# Patient Record
Sex: FEMALE | Race: ASIAN | NOT HISPANIC OR LATINO | URBAN - METROPOLITAN AREA
[De-identification: names, ages, dates, MRNs, and addresses within clinical notes are randomized per-mention and may not be internally consistent; named-entity substitution may affect disease eponyms.]

---

## 2019-08-15 ENCOUNTER — EMERGENCY (EMERGENCY)
Facility: HOSPITAL | Age: 74
LOS: 1 days | Discharge: HOME | End: 2019-08-15
Attending: EMERGENCY MEDICINE | Admitting: EMERGENCY MEDICINE
Payer: MEDICAID

## 2019-08-15 VITALS
SYSTOLIC BLOOD PRESSURE: 101 MMHG | TEMPERATURE: 98 F | DIASTOLIC BLOOD PRESSURE: 70 MMHG | OXYGEN SATURATION: 99 % | RESPIRATION RATE: 18 BRPM | HEART RATE: 89 BPM

## 2019-08-15 VITALS
OXYGEN SATURATION: 97 % | RESPIRATION RATE: 15 BRPM | HEART RATE: 95 BPM | TEMPERATURE: 98 F | DIASTOLIC BLOOD PRESSURE: 86 MMHG | SYSTOLIC BLOOD PRESSURE: 124 MMHG

## 2019-08-15 LAB
ALBUMIN SERPL ELPH-MCNC: 4.4 G/DL — SIGNIFICANT CHANGE UP (ref 3.3–5)
ALP SERPL-CCNC: 65 U/L — SIGNIFICANT CHANGE UP (ref 40–120)
ALT FLD-CCNC: 22 U/L — SIGNIFICANT CHANGE UP (ref 4–33)
ANION GAP SERPL CALC-SCNC: 13 MMO/L — SIGNIFICANT CHANGE UP (ref 7–14)
APPEARANCE UR: CLEAR — SIGNIFICANT CHANGE UP
AST SERPL-CCNC: 18 U/L — SIGNIFICANT CHANGE UP (ref 4–32)
B PERT DNA SPEC QL NAA+PROBE: NOT DETECTED — SIGNIFICANT CHANGE UP
BASE EXCESS BLDV CALC-SCNC: 2.9 MMOL/L — SIGNIFICANT CHANGE UP
BASOPHILS # BLD AUTO: 0.06 K/UL — SIGNIFICANT CHANGE UP (ref 0–0.2)
BASOPHILS NFR BLD AUTO: 0.6 % — SIGNIFICANT CHANGE UP (ref 0–2)
BILIRUB SERPL-MCNC: 0.5 MG/DL — SIGNIFICANT CHANGE UP (ref 0.2–1.2)
BILIRUB UR-MCNC: NEGATIVE — SIGNIFICANT CHANGE UP
BLOOD GAS VENOUS - CREATININE: 0.77 MG/DL — SIGNIFICANT CHANGE UP (ref 0.5–1.3)
BLOOD GAS VENOUS - FIO2: 21 — SIGNIFICANT CHANGE UP
BLOOD UR QL VISUAL: NEGATIVE — SIGNIFICANT CHANGE UP
BUN SERPL-MCNC: 12 MG/DL — SIGNIFICANT CHANGE UP (ref 7–23)
C PNEUM DNA SPEC QL NAA+PROBE: NOT DETECTED — SIGNIFICANT CHANGE UP
CALCIUM SERPL-MCNC: 10 MG/DL — SIGNIFICANT CHANGE UP (ref 8.4–10.5)
CHLORIDE BLDV-SCNC: 107 MMOL/L — SIGNIFICANT CHANGE UP (ref 96–108)
CHLORIDE SERPL-SCNC: 101 MMOL/L — SIGNIFICANT CHANGE UP (ref 98–107)
CK SERPL-CCNC: 68 U/L — SIGNIFICANT CHANGE UP (ref 25–170)
CO2 SERPL-SCNC: 24 MMOL/L — SIGNIFICANT CHANGE UP (ref 22–31)
COLOR SPEC: COLORLESS — SIGNIFICANT CHANGE UP
CREAT SERPL-MCNC: 0.76 MG/DL — SIGNIFICANT CHANGE UP (ref 0.5–1.3)
EOSINOPHIL # BLD AUTO: 0.25 K/UL — SIGNIFICANT CHANGE UP (ref 0–0.5)
EOSINOPHIL NFR BLD AUTO: 2.6 % — SIGNIFICANT CHANGE UP (ref 0–6)
FLUAV H1 2009 PAND RNA SPEC QL NAA+PROBE: NOT DETECTED — SIGNIFICANT CHANGE UP
FLUAV H1 RNA SPEC QL NAA+PROBE: NOT DETECTED — SIGNIFICANT CHANGE UP
FLUAV H3 RNA SPEC QL NAA+PROBE: NOT DETECTED — SIGNIFICANT CHANGE UP
FLUAV SUBTYP SPEC NAA+PROBE: NOT DETECTED — SIGNIFICANT CHANGE UP
FLUBV RNA SPEC QL NAA+PROBE: NOT DETECTED — SIGNIFICANT CHANGE UP
GAS PNL BLDV: 133 MMOL/L — LOW (ref 136–146)
GLUCOSE BLDV-MCNC: 108 MG/DL — HIGH (ref 70–99)
GLUCOSE SERPL-MCNC: 111 MG/DL — HIGH (ref 70–99)
GLUCOSE UR-MCNC: NEGATIVE — SIGNIFICANT CHANGE UP
HADV DNA SPEC QL NAA+PROBE: NOT DETECTED — SIGNIFICANT CHANGE UP
HCO3 BLDV-SCNC: 27 MMOL/L — SIGNIFICANT CHANGE UP (ref 20–27)
HCOV PNL SPEC NAA+PROBE: SIGNIFICANT CHANGE UP
HCT VFR BLD CALC: 40.2 % — SIGNIFICANT CHANGE UP (ref 34.5–45)
HCT VFR BLDV CALC: 40.1 % — SIGNIFICANT CHANGE UP (ref 34.5–45)
HGB BLD-MCNC: 13.2 G/DL — SIGNIFICANT CHANGE UP (ref 11.5–15.5)
HGB BLDV-MCNC: 13 G/DL — SIGNIFICANT CHANGE UP (ref 11.5–15.5)
HMPV RNA SPEC QL NAA+PROBE: NOT DETECTED — SIGNIFICANT CHANGE UP
HPIV1 RNA SPEC QL NAA+PROBE: NOT DETECTED — SIGNIFICANT CHANGE UP
HPIV2 RNA SPEC QL NAA+PROBE: NOT DETECTED — SIGNIFICANT CHANGE UP
HPIV3 RNA SPEC QL NAA+PROBE: NOT DETECTED — SIGNIFICANT CHANGE UP
HPIV4 RNA SPEC QL NAA+PROBE: NOT DETECTED — SIGNIFICANT CHANGE UP
IMM GRANULOCYTES NFR BLD AUTO: 0.4 % — SIGNIFICANT CHANGE UP (ref 0–1.5)
KETONES UR-MCNC: NEGATIVE — SIGNIFICANT CHANGE UP
LACTATE BLDV-MCNC: 1.6 MMOL/L — SIGNIFICANT CHANGE UP (ref 0.5–2)
LEUKOCYTE ESTERASE UR-ACNC: NEGATIVE — SIGNIFICANT CHANGE UP
LYMPHOCYTES # BLD AUTO: 2 K/UL — SIGNIFICANT CHANGE UP (ref 1–3.3)
LYMPHOCYTES # BLD AUTO: 20.8 % — SIGNIFICANT CHANGE UP (ref 13–44)
MCHC RBC-ENTMCNC: 29.1 PG — SIGNIFICANT CHANGE UP (ref 27–34)
MCHC RBC-ENTMCNC: 32.8 % — SIGNIFICANT CHANGE UP (ref 32–36)
MCV RBC AUTO: 88.7 FL — SIGNIFICANT CHANGE UP (ref 80–100)
MONOCYTES # BLD AUTO: 0.94 K/UL — HIGH (ref 0–0.9)
MONOCYTES NFR BLD AUTO: 9.8 % — SIGNIFICANT CHANGE UP (ref 2–14)
NEUTROPHILS # BLD AUTO: 6.33 K/UL — SIGNIFICANT CHANGE UP (ref 1.8–7.4)
NEUTROPHILS NFR BLD AUTO: 65.8 % — SIGNIFICANT CHANGE UP (ref 43–77)
NITRITE UR-MCNC: NEGATIVE — SIGNIFICANT CHANGE UP
NRBC # FLD: 0 K/UL — SIGNIFICANT CHANGE UP (ref 0–0)
PCO2 BLDV: 40 MMHG — LOW (ref 41–51)
PH BLDV: 7.45 PH — HIGH (ref 7.32–7.43)
PH UR: 7 — SIGNIFICANT CHANGE UP (ref 5–8)
PLATELET # BLD AUTO: 283 K/UL — SIGNIFICANT CHANGE UP (ref 150–400)
PMV BLD: 9.5 FL — SIGNIFICANT CHANGE UP (ref 7–13)
PO2 BLDV: 59 MMHG — HIGH (ref 35–40)
POTASSIUM BLDV-SCNC: 3.5 MMOL/L — SIGNIFICANT CHANGE UP (ref 3.4–4.5)
POTASSIUM SERPL-MCNC: 4 MMOL/L — SIGNIFICANT CHANGE UP (ref 3.5–5.3)
POTASSIUM SERPL-SCNC: 4 MMOL/L — SIGNIFICANT CHANGE UP (ref 3.5–5.3)
PROT SERPL-MCNC: 7.6 G/DL — SIGNIFICANT CHANGE UP (ref 6–8.3)
PROT UR-MCNC: NEGATIVE — SIGNIFICANT CHANGE UP
RBC # BLD: 4.53 M/UL — SIGNIFICANT CHANGE UP (ref 3.8–5.2)
RBC # FLD: 11.8 % — SIGNIFICANT CHANGE UP (ref 10.3–14.5)
RSV RNA SPEC QL NAA+PROBE: NOT DETECTED — SIGNIFICANT CHANGE UP
RV+EV RNA SPEC QL NAA+PROBE: NOT DETECTED — SIGNIFICANT CHANGE UP
SAO2 % BLDV: 90.1 % — HIGH (ref 60–85)
SODIUM SERPL-SCNC: 138 MMOL/L — SIGNIFICANT CHANGE UP (ref 135–145)
SP GR SPEC: 1.01 — SIGNIFICANT CHANGE UP (ref 1–1.04)
UROBILINOGEN FLD QL: NORMAL — SIGNIFICANT CHANGE UP
WBC # BLD: 9.62 K/UL — SIGNIFICANT CHANGE UP (ref 3.8–10.5)
WBC # FLD AUTO: 9.62 K/UL — SIGNIFICANT CHANGE UP (ref 3.8–10.5)

## 2019-08-15 PROCEDURE — 99283 EMERGENCY DEPT VISIT LOW MDM: CPT

## 2019-08-15 PROCEDURE — 71046 X-RAY EXAM CHEST 2 VIEWS: CPT | Mod: 26

## 2019-08-15 RX ORDER — IBUPROFEN 200 MG
1 TABLET ORAL
Qty: 6 | Refills: 0
Start: 2019-08-15 | End: 2019-08-16

## 2019-08-15 RX ORDER — IBUPROFEN 200 MG
600 TABLET ORAL ONCE
Refills: 0 | Status: COMPLETED | OUTPATIENT
Start: 2019-08-15 | End: 2019-08-15

## 2019-08-15 RX ADMIN — Medication 600 MILLIGRAM(S): at 08:41

## 2019-08-15 RX ADMIN — Medication 600 MILLIGRAM(S): at 10:08

## 2019-08-15 NOTE — ED PROVIDER NOTE - CLINICAL SUMMARY MEDICAL DECISION MAKING FREE TEXT BOX
mylagias/arthralgias, subjective fever and cough, concern for PNA, pt well appearing, afebrile rectally, S1S2 appreciated, slightly  decreased breath sounds overt Rt lower lobe - labs, CXR, pain control, will reasses;

## 2019-08-15 NOTE — ED ADULT NURSE REASSESSMENT NOTE - NS ED NURSE REASSESS COMMENT FT1
RVP is negative at this time, pt will be taken off of droplet precautions
Pt resting comfortably in bed, states pain is tolerable at this time, pt stable, in NAD, will continue to monitor.

## 2019-08-15 NOTE — ED ADULT TRIAGE NOTE - CHIEF COMPLAINT QUOTE
alert c/o generalized body pains and fever since yesterday   no c/o chest pain  or SOB    treated for pneumonia in jan feb and march   hx htn  acid reflux   temp 97.8 in triage

## 2019-08-15 NOTE — ED PROVIDER NOTE - NSFOLLOWUPINSTRUCTIONS_ED_ALL_ED_FT
PLEASE MAKE SURE THAT YOU FOLLOW UP WITH RHEUMATOLOGIST AS DISCUSSED, ALSO SCHEDULE AN APPOINTMENT TO SEE PULMONOLOGIST AT THE EARLIEST OPENING.  FOR THE NEXT 2 DAYS YOU MAY TAKE LOW DOSES OF IBUPROFEN (400MG EVERY 8HRS); RETURN TO ER FOR WORSENING SYMPTOMS;

## 2019-08-15 NOTE — ED ADULT NURSE REASSESSMENT NOTE - GENERAL PATIENT STATE
cooperative/comfortable appearance/resting/sleeping/improvement verbalized/no change observed/family/SO at bedside

## 2019-08-15 NOTE — ED PROVIDER NOTE - PHYSICAL EXAMINATION
non-toxic appearing   no lumbar spine TTP noted   slightly decreased sounds over Rt lower lobe non-toxic appearing   no lumbar spine TTP noted   slightly decreased sounds over Rt lower lobe   no joint swelling throughout, no warmness to touch non-toxic appearing   no lumbar spine TTP noted   slightly decreased sounds over Rt lower lobe   no joint swelling throughout, no warmness to touch    Attending/Magdaleno: NAD; PERRL/EOMI, non-icterus, supple, no KEVIN, no JVD, RRR, CTAB; Abd-soft, NT/ND, no HSM; no LE edema, no koint PT, eff, or increase warmth, FROM; A&Ox3, nonfocal; Skin-warm/dry

## 2019-08-15 NOTE — ED ADULT NURSE NOTE - OBJECTIVE STATEMENT
73y/o female aaox4 75y/o female aaox4 and ambulatory primarily Urdu speaking; denies translation services at this time, request family at bedside translates. Pt states experiencing generalized body aches x3-4 days; pt states the pain is in muscles and joints; pt states "the pain is where anything bends." Pt states always feeling R leg and R arm pain, but now she feels it everywhere. Pt denies chest pain, SOB, fevers, chills, diaphoresis, palpitations, urinary urgency/frequency, hematuria. Pt denies any recent travel. Vital signs as noted. Call light within reach. Will continue to monitor.

## 2019-08-15 NOTE — ED PROVIDER NOTE - PROGRESS NOTE DETAILS
ELIGIO Lane - pt is feeling significantly better, CXR neg for PNA, joint pain most likely due to rheum process, pt already has denis with rheum - if RVP neg will dc with o/p rheum f/u. will also provide o/p pulm referral for chronic cough;

## 2019-08-15 NOTE — ED PROVIDER NOTE - OBJECTIVE STATEMENT
Pt is 73 y/o female with PMHx of HTN, chronic back pain (Mohawk speaking - translation services offered but but declined and  wishes that daughter translates) here for eval of generalized body pain and arthralgias x 3 days. As per daughter pt occasionaly gets "body pain" but it usually resoles after Tylenol which pt has been taking w/o relief. Pt is 73 y/o female with PMHx of HTN, chronic back pain (Frisian speaking - translation services offered but but declined and  wishes that daughter translates) here for eval of generalized body pain and arthralgias x 3 days. As per daughter pt occasionally gets "body pain" but it usually resoles after Tylenol which pt has been taking w/o relief. Denies HA, rash, neck pain/stiffness, denies abd pain, n/v/d, urinary sx, admits to cough (as per daughter "for a along time, her Rt lung  is not working." (-) recent travel, sick contacts, hiking hx, insect/tick bites. weight loss, hemoptysis, denies cp, sob; rectal temp in the ER - 99.1; pcp - DR Garcia. Gets PT for lower back pain, also scheduled for epidural injection in 2 wks;   pt denies ext weakness or numbness, denies saddle anesthesia, urinary retention, denies fever, chills, urinary sx, chronic steroids use, IVDU, denies hx of malignancy, denies hx of spinal surgeries; Pt is 75 y/o female with PMHx of HTN, chronic back pain (Spanish speaking - translation services offered but but declined and  wishes that daughter translates) here for eval of generalized body pain and arthralgias x 3 days. As per daughter pt occasionally gets "body pain" but it usually resoles after Tylenol which pt has been taking w/o relief. Denies HA, rash, neck pain/stiffness, denies abd pain, n/v/d, urinary sx, admits to cough (as per daughter "for a along time, her Rt lung  is not working." (-) recent travel, sick contacts, hiking hx, insect/tick bites. weight loss, hemoptysis, denies cp, sob; rectal temp in the ER - 99.1; pcp - DR Garcia. Gets PT for lower back pain, also scheduled for epidural injection in 2 wks;   pt denies ext weakness or numbness, denies saddle anesthesia, urinary retention, denies fever, chills, urinary sx, chronic steroids use, IVDU, denies hx of malignancy, denies hx of spinal surgeries;    Attending/Magdaleno: 75 yo F as described as above, including HTN, chronic LBP, recurrent PNA chronic Rt shoulder pain p/w 2-3 days o cough, small productive yellow/brown sputum, and generalized arthralgias and fatigue. Denies fever/chills, SOB, palp, abd painm, chagne in urinary/bowel habits.

## 2019-08-16 LAB
BACTERIA UR CULT: SIGNIFICANT CHANGE UP
SPECIMEN SOURCE: SIGNIFICANT CHANGE UP

## 2019-08-20 LAB
BACTERIA BLD CULT: SIGNIFICANT CHANGE UP
BACTERIA BLD CULT: SIGNIFICANT CHANGE UP

## 2019-09-01 ENCOUNTER — OUTPATIENT (OUTPATIENT)
Dept: OUTPATIENT SERVICES | Facility: HOSPITAL | Age: 74
LOS: 1 days | End: 2019-09-01
Payer: MEDICAID

## 2019-09-01 PROCEDURE — G9001: CPT

## 2019-09-17 DIAGNOSIS — Z71.89 OTHER SPECIFIED COUNSELING: ICD-10-CM

## 2019-09-17 PROBLEM — I10 ESSENTIAL (PRIMARY) HYPERTENSION: Chronic | Status: ACTIVE | Noted: 2019-08-15

## 2023-01-07 ENCOUNTER — INPATIENT (INPATIENT)
Facility: HOSPITAL | Age: 78
LOS: 1 days | Discharge: HOME | End: 2023-01-09
Attending: INTERNAL MEDICINE | Admitting: INTERNAL MEDICINE
Payer: COMMERCIAL

## 2023-01-07 VITALS
TEMPERATURE: 98 F | SYSTOLIC BLOOD PRESSURE: 153 MMHG | OXYGEN SATURATION: 99 % | DIASTOLIC BLOOD PRESSURE: 88 MMHG | RESPIRATION RATE: 18 BRPM | HEART RATE: 99 BPM

## 2023-01-07 DIAGNOSIS — R06.02 SHORTNESS OF BREATH: ICD-10-CM

## 2023-01-07 LAB
ALBUMIN SERPL ELPH-MCNC: 4.1 G/DL — SIGNIFICANT CHANGE UP (ref 3.3–5)
ALP SERPL-CCNC: 67 U/L — SIGNIFICANT CHANGE UP (ref 40–120)
ALT FLD-CCNC: 13 U/L — SIGNIFICANT CHANGE UP (ref 4–33)
ANION GAP SERPL CALC-SCNC: 11 MMOL/L — SIGNIFICANT CHANGE UP (ref 7–14)
AST SERPL-CCNC: 16 U/L — SIGNIFICANT CHANGE UP (ref 4–32)
B PERT DNA SPEC QL NAA+PROBE: SIGNIFICANT CHANGE UP
B PERT+PARAPERT DNA PNL SPEC NAA+PROBE: SIGNIFICANT CHANGE UP
BASE EXCESS BLDV CALC-SCNC: 0.8 MMOL/L — SIGNIFICANT CHANGE UP (ref -2–3)
BASOPHILS # BLD AUTO: 0.05 K/UL — SIGNIFICANT CHANGE UP (ref 0–0.2)
BASOPHILS NFR BLD AUTO: 0.5 % — SIGNIFICANT CHANGE UP (ref 0–2)
BILIRUB SERPL-MCNC: 0.3 MG/DL — SIGNIFICANT CHANGE UP (ref 0.2–1.2)
BLOOD GAS VENOUS COMPREHENSIVE RESULT: SIGNIFICANT CHANGE UP
BORDETELLA PARAPERTUSSIS (RAPRVP): SIGNIFICANT CHANGE UP
BUN SERPL-MCNC: 14 MG/DL — SIGNIFICANT CHANGE UP (ref 7–23)
C PNEUM DNA SPEC QL NAA+PROBE: SIGNIFICANT CHANGE UP
CALCIUM SERPL-MCNC: 9.1 MG/DL — SIGNIFICANT CHANGE UP (ref 8.4–10.5)
CHLORIDE BLDV-SCNC: 106 MMOL/L — SIGNIFICANT CHANGE UP (ref 96–108)
CHLORIDE SERPL-SCNC: 104 MMOL/L — SIGNIFICANT CHANGE UP (ref 98–107)
CO2 BLDV-SCNC: 27.3 MMOL/L — HIGH (ref 22–26)
CO2 SERPL-SCNC: 25 MMOL/L — SIGNIFICANT CHANGE UP (ref 22–31)
CREAT SERPL-MCNC: 1.13 MG/DL — SIGNIFICANT CHANGE UP (ref 0.5–1.3)
EGFR: 50 ML/MIN/1.73M2 — LOW
EOSINOPHIL # BLD AUTO: 0.38 K/UL — SIGNIFICANT CHANGE UP (ref 0–0.5)
EOSINOPHIL NFR BLD AUTO: 3.6 % — SIGNIFICANT CHANGE UP (ref 0–6)
FLUAV SUBTYP SPEC NAA+PROBE: SIGNIFICANT CHANGE UP
FLUBV RNA SPEC QL NAA+PROBE: SIGNIFICANT CHANGE UP
GAS PNL BLDV: 137 MMOL/L — SIGNIFICANT CHANGE UP (ref 136–145)
GLUCOSE BLDV-MCNC: 87 MG/DL — SIGNIFICANT CHANGE UP (ref 70–99)
GLUCOSE SERPL-MCNC: 101 MG/DL — HIGH (ref 70–99)
HADV DNA SPEC QL NAA+PROBE: SIGNIFICANT CHANGE UP
HCO3 BLDV-SCNC: 26 MMOL/L — SIGNIFICANT CHANGE UP (ref 22–29)
HCOV 229E RNA SPEC QL NAA+PROBE: SIGNIFICANT CHANGE UP
HCOV HKU1 RNA SPEC QL NAA+PROBE: SIGNIFICANT CHANGE UP
HCOV NL63 RNA SPEC QL NAA+PROBE: SIGNIFICANT CHANGE UP
HCOV OC43 RNA SPEC QL NAA+PROBE: SIGNIFICANT CHANGE UP
HCT VFR BLD CALC: 39.7 % — SIGNIFICANT CHANGE UP (ref 34.5–45)
HCT VFR BLDA CALC: 38 % — SIGNIFICANT CHANGE UP (ref 34.5–46.5)
HGB BLD CALC-MCNC: 12.8 G/DL — SIGNIFICANT CHANGE UP (ref 11.5–15.5)
HGB BLD-MCNC: 13.2 G/DL — SIGNIFICANT CHANGE UP (ref 11.5–15.5)
HMPV RNA SPEC QL NAA+PROBE: SIGNIFICANT CHANGE UP
HPIV1 RNA SPEC QL NAA+PROBE: SIGNIFICANT CHANGE UP
HPIV2 RNA SPEC QL NAA+PROBE: SIGNIFICANT CHANGE UP
HPIV3 RNA SPEC QL NAA+PROBE: SIGNIFICANT CHANGE UP
HPIV4 RNA SPEC QL NAA+PROBE: SIGNIFICANT CHANGE UP
IANC: 6.08 K/UL — SIGNIFICANT CHANGE UP (ref 1.8–7.4)
IMM GRANULOCYTES NFR BLD AUTO: 1.1 % — HIGH (ref 0–0.9)
LACTATE BLDV-MCNC: 0.9 MMOL/L — SIGNIFICANT CHANGE UP (ref 0.5–2)
LYMPHOCYTES # BLD AUTO: 2.95 K/UL — SIGNIFICANT CHANGE UP (ref 1–3.3)
LYMPHOCYTES # BLD AUTO: 28 % — SIGNIFICANT CHANGE UP (ref 13–44)
M PNEUMO DNA SPEC QL NAA+PROBE: SIGNIFICANT CHANGE UP
MCHC RBC-ENTMCNC: 29.6 PG — SIGNIFICANT CHANGE UP (ref 27–34)
MCHC RBC-ENTMCNC: 33.2 GM/DL — SIGNIFICANT CHANGE UP (ref 32–36)
MCV RBC AUTO: 89 FL — SIGNIFICANT CHANGE UP (ref 80–100)
MONOCYTES # BLD AUTO: 0.96 K/UL — HIGH (ref 0–0.9)
MONOCYTES NFR BLD AUTO: 9.1 % — SIGNIFICANT CHANGE UP (ref 2–14)
NEUTROPHILS # BLD AUTO: 6.08 K/UL — SIGNIFICANT CHANGE UP (ref 1.8–7.4)
NEUTROPHILS NFR BLD AUTO: 57.7 % — SIGNIFICANT CHANGE UP (ref 43–77)
NRBC # BLD: 0 /100 WBCS — SIGNIFICANT CHANGE UP (ref 0–0)
NRBC # FLD: 0 K/UL — SIGNIFICANT CHANGE UP (ref 0–0)
NT-PROBNP SERPL-SCNC: 168 PG/ML — SIGNIFICANT CHANGE UP
PCO2 BLDV: 43 MMHG — HIGH (ref 39–42)
PH BLDV: 7.39 — SIGNIFICANT CHANGE UP (ref 7.32–7.43)
PLATELET # BLD AUTO: 267 K/UL — SIGNIFICANT CHANGE UP (ref 150–400)
PO2 BLDV: 50 MMHG — SIGNIFICANT CHANGE UP
POTASSIUM BLDV-SCNC: 3.3 MMOL/L — LOW (ref 3.5–5.1)
POTASSIUM SERPL-MCNC: 3.7 MMOL/L — SIGNIFICANT CHANGE UP (ref 3.5–5.3)
POTASSIUM SERPL-SCNC: 3.7 MMOL/L — SIGNIFICANT CHANGE UP (ref 3.5–5.3)
PROCALCITONIN SERPL-MCNC: 0.05 NG/ML — SIGNIFICANT CHANGE UP (ref 0.02–0.1)
PROT SERPL-MCNC: 6.9 G/DL — SIGNIFICANT CHANGE UP (ref 6–8.3)
RAPID RVP RESULT: SIGNIFICANT CHANGE UP
RBC # BLD: 4.46 M/UL — SIGNIFICANT CHANGE UP (ref 3.8–5.2)
RBC # FLD: 12.9 % — SIGNIFICANT CHANGE UP (ref 10.3–14.5)
RSV RNA SPEC QL NAA+PROBE: SIGNIFICANT CHANGE UP
RV+EV RNA SPEC QL NAA+PROBE: SIGNIFICANT CHANGE UP
SAO2 % BLDV: 79.9 % — SIGNIFICANT CHANGE UP
SARS-COV-2 RNA SPEC QL NAA+PROBE: SIGNIFICANT CHANGE UP
SODIUM SERPL-SCNC: 140 MMOL/L — SIGNIFICANT CHANGE UP (ref 135–145)
TROPONIN T, HIGH SENSITIVITY RESULT: 7 NG/L — SIGNIFICANT CHANGE UP
TROPONIN T, HIGH SENSITIVITY RESULT: 8 NG/L — SIGNIFICANT CHANGE UP
WBC # BLD: 10.54 K/UL — HIGH (ref 3.8–10.5)
WBC # FLD AUTO: 10.54 K/UL — HIGH (ref 3.8–10.5)

## 2023-01-07 PROCEDURE — G1004: CPT

## 2023-01-07 PROCEDURE — 71045 X-RAY EXAM CHEST 1 VIEW: CPT | Mod: 26

## 2023-01-07 PROCEDURE — 74177 CT ABD & PELVIS W/CONTRAST: CPT | Mod: 26,ME

## 2023-01-07 PROCEDURE — 99285 EMERGENCY DEPT VISIT HI MDM: CPT

## 2023-01-07 RX ORDER — CEFTRIAXONE 500 MG/1
1000 INJECTION, POWDER, FOR SOLUTION INTRAMUSCULAR; INTRAVENOUS ONCE
Refills: 0 | Status: COMPLETED | OUTPATIENT
Start: 2023-01-07 | End: 2023-01-07

## 2023-01-07 RX ORDER — AZITHROMYCIN 500 MG/1
500 TABLET, FILM COATED ORAL ONCE
Refills: 0 | Status: COMPLETED | OUTPATIENT
Start: 2023-01-07 | End: 2023-01-07

## 2023-01-07 RX ADMIN — AZITHROMYCIN 255 MILLIGRAM(S): 500 TABLET, FILM COATED ORAL at 23:11

## 2023-01-07 RX ADMIN — CEFTRIAXONE 100 MILLIGRAM(S): 500 INJECTION, POWDER, FOR SOLUTION INTRAMUSCULAR; INTRAVENOUS at 22:40

## 2023-01-07 NOTE — ED PROVIDER NOTE - NS ED ROS FT
General: denies fever, chills  HENT: denies nasal congestion, rhinorrhea  Eyes: denies visual changes, blurred vision  CV: denies chest pain, palpitations  Resp: + difficulty breathing, cough  Abdominal: + abdominal pain  : denies urinary pain or discharge  MSK: denies muscle aches, leg swelling  Neuro: denies headaches, numbness, tingling  Skin: denies rashes, bruises

## 2023-01-07 NOTE — ED PROVIDER NOTE - OBJECTIVE STATEMENT
78yo F w/ history HTN, chronic back pain (Persian speaking - translation services offered but but declined and  wishes that daughter translates) coming in w/ worsening SOB on exertion. Patient unable to walk a few steps without feeling short of breath. Also has been unable to lay flat and is sleeping with 2-3 pillows prompted at night. Patient has had persistent cough for 2 months and was treated with azithromycin and levofloxacin for it w/ no symptomatic relief. Notes some lower extremity swelling and a "bigger belly". Has not seen a cardiologist in over 3 years.

## 2023-01-07 NOTE — ED ADULT NURSE REASSESSMENT NOTE - NS ED NURSE REASSESS COMMENT FT1
Break coverage RN: Patient resting in stretcher, no distress noted, awaiting CT scan. Patient is A&OX4, airway patent, breathing unlabored and even. Side rails up and safety maintained. Fall precaution in place. Family at bedside. Break coverage RN: Received patient in room 1. Patient resting in stretcher, no distress noted, awaiting CT scan. Patient is A&OX4, airway patent, breathing unlabored and even. Side rails up and safety maintained. Fall precaution in place. Family at bedside.

## 2023-01-07 NOTE — ED PROVIDER NOTE - PROGRESS NOTE DETAILS
Bobo Proctor, DO PGY-3: ISO atelectasis seen on CT, persistent sob, will treat for CAP and admit for severe NAGY, normal probnp so no indication do diurese at this time

## 2023-01-07 NOTE — ED ADULT NURSE NOTE - OBJECTIVE STATEMENT
A04, ambulatory 78 y/o female with pmhx of HTN and asthma presents to the ED for SOB and cough. Patient daughter translating for patient as requested by patient. Patients shortness of breath gets worst with exertion and when laying down. Patient failed outpatient abx. Patient denies chest pain, dizziness and lightheadedness.  Breathing is spontaneous and unlabored. Patient is on RA. IV placed aseptically, labs drawn and sent as per order. Bed in lowest position, call bell within reach, all other safety and comfort measures provided. awaiting labs results and further orders.

## 2023-01-07 NOTE — ED PROVIDER NOTE - ATTENDING CONTRIBUTION TO CARE
The patient is a 77y Female who has a past medical and surgery history of HTN PTED after being lost to cardiology followup for 3 years with coughing NAGY after only walking a few steps 2-3 pillows othopnea lower extremity swelling and abdominal distension. Attempts to treat symptoms with azithromycin and levofloxacin over the past month have been unsuccessful Vital Signs Last 24 Hrs  T(F): 99 HR: 94 BP: 151/101 RR: 20 SpO2: 98% (07 Jan 2023 15:48)   PE: as described; my additions and exceptions are noted in the chart    DATA:  EKG: pending at time of evaluation ; no old EKG  LAB: Pending at time of evaluation    IMPRESSION/RISK:  Dx=Anasarca   Consideration include Would admit and  treat for chf, echo cardiology input   Plan  as above would control bp with ACEI or hydralazine/ntg if creatinine elevated diuresis troponin BNP EKGs cardiac monitor

## 2023-01-07 NOTE — ED PROVIDER NOTE - PHYSICAL EXAMINATION
GENERAL: well appearing in no acute distress, non-toxic appearing  HEAD: normocephalic, atraumatic  HENT: airway intact  EYES:  normal conjunctiva  CARDIAC: regular rate and rhythm, normal S1S2, no appreciable murmurs, 2+ pulses in UE/LE b/l  PULM: normal breath sounds, clear to ascultation bilaterally, no rales, rhonchi, wheezing  GI: abdomen mild distention; soft, RUQ TTP; no guarding, rebound tenderness  NEURO: no focal motor or sensory deficits  MSK: mild b/l lower extremity edema  SKIN: well-perfused, extremities warm, no visible rashes

## 2023-01-07 NOTE — ED ADULT TRIAGE NOTE - CHIEF COMPLAINT QUOTE
coughing with SOB that is worsening in nature despite ABX course by PMD. PT's family also reports recent B/P elevation. PMH asthma, PNA, HTN

## 2023-01-07 NOTE — ED PROVIDER NOTE - CLINICAL SUMMARY MEDICAL DECISION MAKING FREE TEXT BOX
76 yo F w/ history of HTN, chronic back pain (Azeri speaking - translation services offered but but declined and  wishes that daughter translates) coming in w/ exertional dyspnea and orthopnea concerning for new onset CHF possibly from hypertrophic cardiomyopathy; patients exam notable for distention and RUQ pain; will eval with CTAP for biliary pathology

## 2023-01-08 ENCOUNTER — TRANSCRIPTION ENCOUNTER (OUTPATIENT)
Age: 78
End: 2023-01-08

## 2023-01-08 DIAGNOSIS — I10 ESSENTIAL (PRIMARY) HYPERTENSION: ICD-10-CM

## 2023-01-08 DIAGNOSIS — R10.9 UNSPECIFIED ABDOMINAL PAIN: ICD-10-CM

## 2023-01-08 DIAGNOSIS — R06.09 OTHER FORMS OF DYSPNEA: ICD-10-CM

## 2023-01-08 DIAGNOSIS — Z29.9 ENCOUNTER FOR PROPHYLACTIC MEASURES, UNSPECIFIED: ICD-10-CM

## 2023-01-08 LAB
ALBUMIN SERPL ELPH-MCNC: 3.5 G/DL — SIGNIFICANT CHANGE UP (ref 3.3–5)
ALP SERPL-CCNC: 68 U/L — SIGNIFICANT CHANGE UP (ref 40–120)
ALT FLD-CCNC: 14 U/L — SIGNIFICANT CHANGE UP (ref 4–33)
ANION GAP SERPL CALC-SCNC: 13 MMOL/L — SIGNIFICANT CHANGE UP (ref 7–14)
APPEARANCE UR: CLEAR — SIGNIFICANT CHANGE UP
AST SERPL-CCNC: 19 U/L — SIGNIFICANT CHANGE UP (ref 4–32)
BACTERIA # UR AUTO: ABNORMAL
BASOPHILS # BLD AUTO: 0.05 K/UL — SIGNIFICANT CHANGE UP (ref 0–0.2)
BASOPHILS NFR BLD AUTO: 0.5 % — SIGNIFICANT CHANGE UP (ref 0–2)
BILIRUB DIRECT SERPL-MCNC: <0.2 MG/DL — SIGNIFICANT CHANGE UP (ref 0–0.3)
BILIRUB INDIRECT FLD-MCNC: >0.2 MG/DL — SIGNIFICANT CHANGE UP (ref 0–1)
BILIRUB SERPL-MCNC: 0.4 MG/DL — SIGNIFICANT CHANGE UP (ref 0.2–1.2)
BILIRUB UR-MCNC: NEGATIVE — SIGNIFICANT CHANGE UP
BUN SERPL-MCNC: 13 MG/DL — SIGNIFICANT CHANGE UP (ref 7–23)
CALCIUM SERPL-MCNC: 8.7 MG/DL — SIGNIFICANT CHANGE UP (ref 8.4–10.5)
CHLORIDE SERPL-SCNC: 100 MMOL/L — SIGNIFICANT CHANGE UP (ref 98–107)
CO2 SERPL-SCNC: 23 MMOL/L — SIGNIFICANT CHANGE UP (ref 22–31)
COLOR SPEC: SIGNIFICANT CHANGE UP
CREAT SERPL-MCNC: 0.95 MG/DL — SIGNIFICANT CHANGE UP (ref 0.5–1.3)
DIFF PNL FLD: NEGATIVE — SIGNIFICANT CHANGE UP
EGFR: 62 ML/MIN/1.73M2 — SIGNIFICANT CHANGE UP
EOSINOPHIL # BLD AUTO: 0.35 K/UL — SIGNIFICANT CHANGE UP (ref 0–0.5)
EOSINOPHIL NFR BLD AUTO: 3.7 % — SIGNIFICANT CHANGE UP (ref 0–6)
EPI CELLS # UR: 3 /HPF — SIGNIFICANT CHANGE UP (ref 0–5)
GLUCOSE SERPL-MCNC: 93 MG/DL — SIGNIFICANT CHANGE UP (ref 70–99)
GLUCOSE UR QL: NEGATIVE — SIGNIFICANT CHANGE UP
HCT VFR BLD CALC: 36.5 % — SIGNIFICANT CHANGE UP (ref 34.5–45)
HGB BLD-MCNC: 11.9 G/DL — SIGNIFICANT CHANGE UP (ref 11.5–15.5)
HYALINE CASTS # UR AUTO: 1 /LPF — SIGNIFICANT CHANGE UP (ref 0–7)
IANC: 5.24 K/UL — SIGNIFICANT CHANGE UP (ref 1.8–7.4)
IMM GRANULOCYTES NFR BLD AUTO: 0.9 % — SIGNIFICANT CHANGE UP (ref 0–0.9)
INR BLD: 1.09 RATIO — SIGNIFICANT CHANGE UP (ref 0.88–1.16)
KETONES UR-MCNC: NEGATIVE — SIGNIFICANT CHANGE UP
LEUKOCYTE ESTERASE UR-ACNC: ABNORMAL
LYMPHOCYTES # BLD AUTO: 2.9 K/UL — SIGNIFICANT CHANGE UP (ref 1–3.3)
LYMPHOCYTES # BLD AUTO: 30.5 % — SIGNIFICANT CHANGE UP (ref 13–44)
MAGNESIUM SERPL-MCNC: 2.1 MG/DL — SIGNIFICANT CHANGE UP (ref 1.6–2.6)
MCHC RBC-ENTMCNC: 28.7 PG — SIGNIFICANT CHANGE UP (ref 27–34)
MCHC RBC-ENTMCNC: 32.6 GM/DL — SIGNIFICANT CHANGE UP (ref 32–36)
MCV RBC AUTO: 88 FL — SIGNIFICANT CHANGE UP (ref 80–100)
MONOCYTES # BLD AUTO: 0.89 K/UL — SIGNIFICANT CHANGE UP (ref 0–0.9)
MONOCYTES NFR BLD AUTO: 9.3 % — SIGNIFICANT CHANGE UP (ref 2–14)
NEUTROPHILS # BLD AUTO: 5.24 K/UL — SIGNIFICANT CHANGE UP (ref 1.8–7.4)
NEUTROPHILS NFR BLD AUTO: 55.1 % — SIGNIFICANT CHANGE UP (ref 43–77)
NITRITE UR-MCNC: NEGATIVE — SIGNIFICANT CHANGE UP
NRBC # BLD: 0 /100 WBCS — SIGNIFICANT CHANGE UP (ref 0–0)
NRBC # FLD: 0 K/UL — SIGNIFICANT CHANGE UP (ref 0–0)
PH UR: 6.5 — SIGNIFICANT CHANGE UP (ref 5–8)
PLATELET # BLD AUTO: 252 K/UL — SIGNIFICANT CHANGE UP (ref 150–400)
POTASSIUM SERPL-MCNC: 3.7 MMOL/L — SIGNIFICANT CHANGE UP (ref 3.5–5.3)
POTASSIUM SERPL-SCNC: 3.7 MMOL/L — SIGNIFICANT CHANGE UP (ref 3.5–5.3)
PROT SERPL-MCNC: 6.3 G/DL — SIGNIFICANT CHANGE UP (ref 6–8.3)
PROT UR-MCNC: ABNORMAL
PROTHROM AB SERPL-ACNC: 12.6 SEC — SIGNIFICANT CHANGE UP (ref 10.5–13.4)
RBC # BLD: 4.15 M/UL — SIGNIFICANT CHANGE UP (ref 3.8–5.2)
RBC # FLD: 12.7 % — SIGNIFICANT CHANGE UP (ref 10.3–14.5)
RBC CASTS # UR COMP ASSIST: 2 /HPF — SIGNIFICANT CHANGE UP (ref 0–4)
SODIUM SERPL-SCNC: 136 MMOL/L — SIGNIFICANT CHANGE UP (ref 135–145)
SP GR SPEC: >1.05 (ref 1.01–1.05)
TSH SERPL-MCNC: 4.01 UIU/ML — SIGNIFICANT CHANGE UP (ref 0.27–4.2)
UROBILINOGEN FLD QL: SIGNIFICANT CHANGE UP
WBC # BLD: 9.52 K/UL — SIGNIFICANT CHANGE UP (ref 3.8–10.5)
WBC # FLD AUTO: 9.52 K/UL — SIGNIFICANT CHANGE UP (ref 3.8–10.5)
WBC UR QL: 18 /HPF — HIGH (ref 0–5)

## 2023-01-08 PROCEDURE — 71275 CT ANGIOGRAPHY CHEST: CPT | Mod: 26

## 2023-01-08 PROCEDURE — 12345: CPT | Mod: NC

## 2023-01-08 PROCEDURE — 99223 1ST HOSP IP/OBS HIGH 75: CPT

## 2023-01-08 RX ORDER — BUDESONIDE AND FORMOTEROL FUMARATE DIHYDRATE 160; 4.5 UG/1; UG/1
2 AEROSOL RESPIRATORY (INHALATION)
Qty: 0 | Refills: 0 | DISCHARGE

## 2023-01-08 RX ORDER — HEPARIN SODIUM 5000 [USP'U]/ML
5000 INJECTION INTRAVENOUS; SUBCUTANEOUS EVERY 12 HOURS
Refills: 0 | Status: DISCONTINUED | OUTPATIENT
Start: 2023-01-08 | End: 2023-01-09

## 2023-01-08 RX ORDER — ACETAMINOPHEN 500 MG
650 TABLET ORAL EVERY 6 HOURS
Refills: 0 | Status: DISCONTINUED | OUTPATIENT
Start: 2023-01-08 | End: 2023-01-09

## 2023-01-08 RX ORDER — ALBUTEROL 90 UG/1
2 AEROSOL, METERED ORAL EVERY 6 HOURS
Refills: 0 | Status: DISCONTINUED | OUTPATIENT
Start: 2023-01-08 | End: 2023-01-09

## 2023-01-08 RX ORDER — BUDESONIDE AND FORMOTEROL FUMARATE DIHYDRATE 160; 4.5 UG/1; UG/1
2 AEROSOL RESPIRATORY (INHALATION)
Refills: 0 | Status: DISCONTINUED | OUTPATIENT
Start: 2023-01-08 | End: 2023-01-09

## 2023-01-08 RX ORDER — LOSARTAN POTASSIUM 100 MG/1
1 TABLET, FILM COATED ORAL
Qty: 0 | Refills: 0 | DISCHARGE

## 2023-01-08 RX ADMIN — HEPARIN SODIUM 5000 UNIT(S): 5000 INJECTION INTRAVENOUS; SUBCUTANEOUS at 05:19

## 2023-01-08 RX ADMIN — BUDESONIDE AND FORMOTEROL FUMARATE DIHYDRATE 2 PUFF(S): 160; 4.5 AEROSOL RESPIRATORY (INHALATION) at 11:58

## 2023-01-08 RX ADMIN — HEPARIN SODIUM 5000 UNIT(S): 5000 INJECTION INTRAVENOUS; SUBCUTANEOUS at 18:34

## 2023-01-08 RX ADMIN — BUDESONIDE AND FORMOTEROL FUMARATE DIHYDRATE 2 PUFF(S): 160; 4.5 AEROSOL RESPIRATORY (INHALATION) at 21:33

## 2023-01-08 NOTE — DISCHARGE NOTE PROVIDER - NSFOLLOWUPCLINICS_GEN_ALL_ED_FT
[facility]  Pulmonology  [address] [place] [address], [place] [address]  Phone: [phone]  Fax:

## 2023-01-08 NOTE — H&P ADULT - PROBLEM SELECTOR PLAN 3
resume losartan (reportedly takes 75mg total daily? split up BID)  -will resume as losartan 25mg daily for now and uptitrate

## 2023-01-08 NOTE — H&P ADULT - PROBLEM SELECTOR PLAN 2
unclear, abd overall soft on exam (minimal tenderness), CT without obvious acute etiology. continue maalox for now. Reportedly off and on for years, possibly from cholelithiasis?

## 2023-01-08 NOTE — H&P ADULT - ASSESSMENT
76 y/o F w/ HTN, chronic back pain and recurrent pneumonias (on inhalers) who presents to the ER w/ worsening dyspnea on exertion and cough over the past 2 months. Admitted for further evaluation

## 2023-01-08 NOTE — DISCHARGE NOTE PROVIDER - HOSPITAL COURSE
76 y/o F w/ HTN, chronic back pain and recurrent pneumonias (on inhalers) who presents to the ER w/ worsening dyspnea on exertion and cough over the past 2 months.     #Dyspnea on exertion.   Possibly from chronic lung disease (from freq pna) vs chf  Patient euvolemic on exam, at room air comfortable. labs unremarkable. CTA chest was negative for pneumonia and no pulmonary embolism  -TTE  -albuterol/ipratropium prn for wheezing  -c/w home inhalers    #Abdominal pain.   CT abdomen without acute etiology but cholelithiasis  -follow up with primary care provider   -resolved     #Hypertension  continue losartan       77F PMH of HTN, chronic back pain, and recurrent pneumonias (on inhalers) who presents to the ER w/ worsening dyspnea on exertion and cough over the past 2 months. Admitted for further evaluation.    Dyspnea on exertion  - possibly from chronic lung disease (from frequent PNA) vs. CHF/pulm HTN  - clinically euvolemic  - CXR w/ lower lobe atelectasis; CT abdomen showed LLL atelectasis; CTA chest negative for PE/PNA  - possibly has pulm HTN; trops stable, EKG NSR; currently saturating well on room air  - TTE ___*****  - c/w albuterol/ipratropium PRN for wheezing  - c/w home inhalers  - outpatient follow up with pulmonary for PFTs    Abdominal pain  - resolved; unclear etiology, abdomen overall soft on exam (minimal tenderness)  - CT A/P: without obvious acute etiology  - c/w maalox for now  - reportedly off and on for years, possibly from cholelithiasis  - outpatient follow up for cholelithiasis    Hypertension  - on losartan (reportedly takes 75mg total daily) at home  - c/w losartan 25mg daily for now and uptitrate as needed    Case discussed with  ___ on ___. Patient is medically stable and optimized for discharge as per attending. All medications were reviewed and prescriptions were sent to a mutually agreed upon pharmacy. Discharge plan reviewed with patient and/or family.    77F PMH of HTN, chronic back pain, and recurrent pneumonias (on inhalers) who presents to the ER w/ worsening dyspnea on exertion and cough over the past 2 months. Admitted for further evaluation.    Dyspnea   Patient report shortness of breath and cough, worse when laying flat. Present for 2 months.   - possibly from chronic lung disease (from frequent PNA) vs. CHF/pulm HTN vs GERD  - clinically euvolemic  - CXR w/ lower lobe atelectasis; CT abdomen showed LLL atelectasis; CTA chest negative for PE/PNA  - possibly has pulm HTN; trops stable, EKG NSR; currently saturating well on room air  - TTE ___*****  - c/w albuterol/ipratropium PRN for wheezing  - c/w home inhalers  -start PPI for possible GERD  - outpatient follow up with pulmonary for PFTs    Abdominal pain  - resolved; unclear etiology, abdomen overall soft on exam (minimal tenderness)  - CT A/P: without obvious acute etiology  - c/w maalox for now  - reportedly off and on for years, possibly from cholelithiasis  - outpatient follow up for cholelithiasis    Hypertension  - on losartan (reportedly takes 75mg total daily) at home  - c/w losartan 25mg daily for now, resume home dose on discharge    Case discussed with Dr. Parth Lay on ___. Patient is medically stable and optimized for discharge as per attending. All medications were reviewed and prescriptions were sent to a mutually agreed upon pharmacy. Discharge plan reviewed with patient and/or family.    77F PMH of HTN, chronic back pain, and recurrent pneumonias (on inhalers) who presents to the ER w/ worsening dyspnea on exertion and cough over the past 2 months. Admitted for further evaluation.    Dyspnea   Patient report shortness of breath and cough, worse when laying flat. Present for 2 months.   - possibly from chronic lung disease (from frequent PNA) vs. CHF/pulm HTN vs GERD  - clinically euvolemic  - CXR w/ lower lobe atelectasis; CT abdomen showed LLL atelectasis; CTA chest negative for PE/PNA  - possibly has pulm HTN; trops stable, EKG NSR; currently saturating well on room air  - TTE ___*****  - c/w albuterol/ipratropium PRN for wheezing  - c/w home inhalers  -start PPI for possible GERD  - outpatient follow up with pulmonary for PFTs    Abdominal pain  - resolved; unclear etiology, abdomen overall soft on exam (minimal tenderness)  - CT A/P: without obvious acute etiology  - c/w maalox for now  - reportedly off and on for years, possibly from cholelithiasis  - outpatient follow up for cholelithiasis    Hypertension  - on losartan (reportedly takes 75mg total daily) at home  - c/w losartan 50mg daily for now, resume home dose on discharge    Case discussed with Dr. Parth Lay on ___. Patient is medically stable and optimized for discharge as per attending. All medications were reviewed and prescriptions were sent to a mutually agreed upon pharmacy. Discharge plan reviewed with patient and/or family.    77F PMH of HTN, chronic back pain, and recurrent pneumonias (on inhalers) who presents to the ER w/ worsening dyspnea on exertion and cough over the past 2 months. Admitted for further evaluation.    Dyspnea   Patient report shortness of breath and cough, worse when laying flat. Present for 2 months.   - possibly from chronic lung disease (from frequent PNA) vs. CHF/pulm HTN vs GERD  - clinically euvolemic  - CXR w/ lower lobe atelectasis; CT abdomen showed LLL atelectasis; CTA chest negative for PE/PNA  - possibly has pulm HTN; trops stable, EKG NSR; currently saturating well on room air  - TTE ___*****  - c/w albuterol/ipratropium PRN for wheezing  - c/w home inhalers  -start PPI for possible GERD  - outpatient follow up with pulmonary for PFTs    Abdominal pain  - resolved; unclear etiology, abdomen overall soft on exam (minimal tenderness)  - CT A/P: without obvious acute etiology  - c/w maalox for now  - reportedly off and on for years, possibly from cholelithiasis  - outpatient follow up for cholelithiasis    Hypertension  - on losartan (reportedly takes 75mg total daily) at home  - c/w losartan 50mg daily for now, resume home dose on discharge    Case discussed with Dr. Parth Lay on 1/9/23 . Patient is medically stable and optimized for discharge as per attending. All medications were reviewed and prescriptions were sent to a mutually agreed upon pharmacy. Discharge plan reviewed with patient and/or family.    77F PMH of HTN, chronic back pain, and recurrent pneumonias (on inhalers) who presents to the ER w/ worsening dyspnea on exertion and cough over the past 2 months. Admitted for further evaluation.    Dyspnea   Patient report shortness of breath and cough, worse when laying flat. Present for 2 months.   - possibly from chronic lung disease (from frequent PNA) vs. CHF/pulm HTN vs GERD  - clinically euvolemic  - CXR w/ lower lobe atelectasis; CT abdomen showed LLL atelectasis; CTA chest negative for PE/PNA  - possibly has pulm HTN; trops stable, EKG NSR; currently saturating well on room air  - TTE performed 1/9 showed normal systolic function, mild pulmonary hypertension and mild diastolic dysfunction.   - c/w albuterol/ipratropium PRN for wheezing  - c/w home inhalers  -started PPI for possible GERD  - outpatient follow up with pulmonary for PFTs    Abdominal pain  - resolved; unclear etiology, abdomen overall soft on exam (minimal tenderness)  - CT A/P: without obvious acute etiology  - c/w maalox for now  - reportedly off and on for years, possibly from cholelithiasis  - outpatient follow up for cholelithiasis    Hypertension  - on losartan (reportedly takes 75mg total daily) at home  - c/w losartan 50mg daily for now, resume home dose on discharge    Case discussed with Dr. Parth Lay on 1/9/23 . Patient is medically stable and optimized for discharge as per attending. All medications were reviewed and prescriptions were sent to a mutually agreed upon pharmacy. Discharge plan reviewed with patient and/or family.

## 2023-01-08 NOTE — DISCHARGE NOTE PROVIDER - NSDCFUADDAPPT_GEN_ALL_CORE_FT
Follow up with your primary care provider in 1-2 weeks of discharge.    Follow up with pulmonology for outpatient pulmonary function testing.

## 2023-01-08 NOTE — DISCHARGE NOTE PROVIDER - ATTENDING DISCHARGE PHYSICAL EXAMINATION:
.  VITAL SIGNS:  T(C): 37.3 (01-09-23 @ 13:14), Max: 37.3 (01-09-23 @ 13:14)  T(F): 99.2 (01-09-23 @ 13:14), Max: 99.2 (01-09-23 @ 13:14)  HR: 81 (01-09-23 @ 13:14) (79 - 88)  BP: 141/73 (01-09-23 @ 13:14) (126/71 - 141/73)  BP(mean): --  RR: 18 (01-09-23 @ 13:14) (17 - 18)  SpO2: 100% (01-09-23 @ 13:14) (96% - 100%)  Wt(kg): --    PHYSICAL EXAM:    Constitutional: WDWN resting comfortably in bed; NAD  Head: NC/AT  Eyes: PERRL, EOMI, anicteric sclera  ENT: no nasal discharge; uvula midline, no oropharyngeal erythema or exudates; MMM  Neck: supple; no JVD or thyromegaly  Respiratory: CTA B/L; no W/R/R, no retractions  Cardiac: +S1/S2; RRR; no M/R/G; PMI non-displaced  Gastrointestinal: abdomen soft, NT/ND; no rebound or guarding; +BSx4  Extremities: WWP, no clubbing or cyanosis; no peripheral edema  Musculoskeletal: NROM x4; no joint swelling, tenderness or erythema  Vascular: 2+ radial, femoral, DP/PT pulses B/L  Dermatologic: skin warm, dry and intact; no rashes, wounds, or scars  Lymphatic: no submandibular or cervical LAD  Neurologic: AAOx3; CNII-XII grossly intact; no focal deficits  Psychiatric: affect and characteristics of appearance, verbalizations, behaviors are appropriate

## 2023-01-08 NOTE — PROGRESS NOTE ADULT - PROBLEM SELECTOR PLAN 3
resume losartan (reportedly takes 75mg total daily? split up BID)  -here on losartan 25mg daily for now and uptitrate as needed

## 2023-01-08 NOTE — DISCHARGE NOTE PROVIDER - NSDCMRMEDTOKEN_GEN_ALL_CORE_FT
albuterol 2.5 mg/3 mL (0.083%) inhalation solution:   ibuprofen 400 mg oral tablet: 1 tab(s) orally every 8 hours   Incruse Ellipta 62.5 mcg/inh inhalation powder: 1 puff(s) inhaled every 24 hours  losartan 50 mg oral tablet: 1 tab(s) orally once a day  Symbicort 160 mcg-4.5 mcg/inh inhalation aerosol: 2 puff(s) inhaled 2 times a day   acetaminophen 325 mg oral tablet: 2 tab(s) orally every 6 hours, As needed, Temp greater or equal to 38C (100.4F), Mild Pain (1 - 3)  albuterol 90 mcg/inh inhalation aerosol: 2 puff(s) inhaled every 6 hours, As needed, Shortness of Breath  losartan 50 mg oral tablet: 1 tab(s) orally once a day  pantoprazole 40 mg oral delayed release tablet: 1 tab(s) orally once a day (before a meal)  Symbicort 160 mcg-4.5 mcg/inh inhalation aerosol: 2 puff(s) inhaled 2 times a day

## 2023-01-08 NOTE — PROGRESS NOTE ADULT - ASSESSMENT
78 y/o F w/ HTN, chronic back pain and recurrent pneumonias (on inhalers) who presents to the ER w/ worsening dyspnea on exertion and cough over the past 2 months. Admitted for further evaluation

## 2023-01-08 NOTE — DISCHARGE NOTE PROVIDER - NSDCCPCAREPLAN_GEN_ALL_CORE_FT
PRINCIPAL DISCHARGE DIAGNOSIS  Diagnosis: Dyspnea on exertion  Assessment and Plan of Treatment: You presented for months of shortness of breath on ambulation and cough. A CT chest was done and was negative for pneumonia and negative for pulmonary embolism. A echocardiogram was done and showed?      SECONDARY DISCHARGE DIAGNOSES  Diagnosis: Abdominal pain  Assessment and Plan of Treatment: You presented with abdominal discomfort. CT abdomen showed stones in your gallbladder. You have now improved. Please follow up with primary care provider    Diagnosis: Cholelithiasis  Assessment and Plan of Treatment: You were found to have stones in your gallbladder. Please follow up with your primary care provider    Diagnosis: Hypertension  Assessment and Plan of Treatment: continue losartan     PRINCIPAL DISCHARGE DIAGNOSIS  Diagnosis: Dyspnea on exertion  Assessment and Plan of Treatment: You presented for months of shortness of breath on ambulation and cough. A CT chest was done and was negative for pneumonia and negative for pulmonary embolism. A echocardiogram was done and showed ___*****      SECONDARY DISCHARGE DIAGNOSES  Diagnosis: Abdominal pain  Assessment and Plan of Treatment: You presented with abdominal discomfort. CT abdomen showed stones in your gallbladder. You have now improved. Please follow up with primary care provider on discharge.    Diagnosis: Hypertension  Assessment and Plan of Treatment: Follow a low sodium/low fat diet, continue your anti-hypertensive medications as prescribed, and follow up with your primary care provider.    Diagnosis: Cholelithiasis  Assessment and Plan of Treatment: You were found to have stones in your gallbladder. Please follow up with your primary care provider on discharge.     PRINCIPAL DISCHARGE DIAGNOSIS  Diagnosis: Dyspnea on exertion  Assessment and Plan of Treatment: You presented for months of shortness of breath on ambulation/ when laying flat and cough. A CT chest was done and was negative for pneumonia and negative for pulmonary embolism. A echocardiogram was done and showed ___*****. You most likely have COPD or acid reflux.   -Please follow up with your pulmonologist and primary care doctor.  -continue pantoprazole for possibly acid reflux  -continue your inhalers      SECONDARY DISCHARGE DIAGNOSES  Diagnosis: Abdominal pain  Assessment and Plan of Treatment: You presented with abdominal discomfort. CT abdomen showed stones in your gallbladder. You have now improved. Please follow up with primary care provider on discharge.    Diagnosis: Cholelithiasis  Assessment and Plan of Treatment: You were found to have stones in your gallbladder. Please follow up with your primary care provider on discharge.    Diagnosis: Hypertension  Assessment and Plan of Treatment: Follow a low sodium/low fat diet, continue your anti-hypertensive medications as prescribed, and follow up with your primary care provider.

## 2023-01-08 NOTE — PROGRESS NOTE ADULT - PROBLEM SELECTOR PLAN 1
Possibly from chronic lung disease (from freq pna) vs chf/pulm htn  - clinically euvolemic  -CT abd showed some atelectasis and CT PE nega for PE or PNA  -check echo  -watch on tele: no events  -IF TTE negative, discharge home with OP Pulmonary follow up for PFTs

## 2023-01-08 NOTE — H&P ADULT - NSHPPHYSICALEXAM_GEN_ALL_CORE
PHYSICAL EXAM:  GENERAL: NAD, well-developed, well-nourished  HEAD:  Atraumatic, Normocephalic  EYES: EOMI, PERRLA, conjunctiva and sclera clear  NECK: Supple, No JVD  CHEST/LUNG: Clear to auscultation bilaterally; No wheezes, rales or rhonchi  HEART: Regular rate and rhythm; No murmurs, rubs, or gallops, (+)S1, S2  ABDOMEN: Soft, +minimally ttp in periumbilical area. mildly distended. no rebound or guarding. +Bowel sounds  EXTREMITIES:  2+ Peripheral Pulses, No clubbing, cyanosis, or edema  PSYCH: normal mood and affect  NEUROLOGY: AAOx3, non-focal  SKIN: No rashes or lesions

## 2023-01-08 NOTE — PATIENT PROFILE ADULT - FALL HARM RISK - UNIVERSAL INTERVENTIONS
Bed in lowest position, wheels locked, appropriate side rails in place/Call bell, personal items and telephone in reach/Instruct patient to call for assistance before getting out of bed or chair/Non-slip footwear when patient is out of bed/[place] to call system/Physically safe environment - no spills, clutter or unnecessary equipment/Purposeful Proactive Rounding/Room/bathroom lighting operational, light cord in reach

## 2023-01-08 NOTE — H&P ADULT - HISTORY OF PRESENT ILLNESS
Pt is a 78 y/o F w/ HTN, chronic back pain and recurrent pneumonias (on inhalers) who presents to the ER w/ worsening dyspnea on exertion and cough over the past 2 months. She cannot walk a few steps without feeling sob and is unable to lay flat. She was recently given antibiotcs to treat a potential pna but has not had relief. She also reports intermittent R side muscle pains that is chronic but no weakness or numbness. She reports she feels more swollen particularly in her abdomen and her legs at times and has chronic intermittent abd pain. No hx of CAD reported nor CHF. She also has intermittent dysuria and conspitation. No fever, chest pain or diarrhea. In the ER pt was given antibiotics

## 2023-01-08 NOTE — DISCHARGE NOTE PROVIDER - CARE PROVIDER_API CALL
Juan Carlos Lundberg  INTERNAL MEDICINE  [phone] [place] [address]  Phone: [phone]  Fax: [phone]  Follow Up Time:

## 2023-01-08 NOTE — ED ADULT NURSE REASSESSMENT NOTE - NS ED NURSE REASSESS COMMENT FT1
Resting comfortably at bedside. not in acute distress. VSS. Respirations are even and non labored. Safety maintained. Daughter at bedside.

## 2023-01-08 NOTE — H&P ADULT - NSHPREVIEWOFSYSTEMS_GEN_ALL_CORE
REVIEW OF SYSTEMS:    CONSTITUTIONAL: +generalized weakness, no fevers or chills  EYES/ENT: No visual changes;  No dysphagia  NECK: No pain or stiffness  RESPIRATORY: +cough and dyspnea on exertion. no wheezing nor hemoptysis  CARDIOVASCULAR: No chest pain or palpitations;   GASTROINTESTINAL: +abdominal pain +intermittent constipation. No nausea, vomiting, or hematemesis; No diarrhea. No melena or hematochezia.  GENITOURINARY: +dysuria, no frequency or hematuria  NEUROLOGICAL: No numbness or weakness  SKIN: No itching, burning, rashes, or lesions   MSK: +intermittent R side muscle pain

## 2023-01-08 NOTE — ED ADULT NURSE REASSESSMENT NOTE - NS ED NURSE REASSESS COMMENT FT1
Pt. lying in bed comfortably. no complaints at this time. Awaiting bed assignment. Vitals stable. Will continue to monitor.

## 2023-01-08 NOTE — PROGRESS NOTE ADULT - PROBLEM SELECTOR PLAN 2
unclear, abd overall soft on exam (minimal tenderness), CT without obvious acute etiology. continue maalox for now. Reportedly off and on for years, possibly from cholelithiasis?  -resolved  -OP follow up for cholelithiasis

## 2023-01-08 NOTE — PROGRESS NOTE ADULT - SUBJECTIVE AND OBJECTIVE BOX
[facility] Division of Hospital Medicine  Trinidad Lay MD  Available via MS Teams  Pager: [phone]    SUBJECTIVE / OVERNIGHT EVENTS:  No events. patient feels improved. States 2 months of cough and shortness of breath.     [facility]  [id number]    MEDICATIONS  (STANDING):  budesonide 160 MICROgram(s)/formoterol 4.5 MICROgram(s) Inhaler 2 Puff(s) Inhalation two times a day  heparin   Injectable 5000 Unit(s) SubCutaneous every 12 hours    MEDICATIONS  (PRN):  acetaminophen     Tablet .. 650 milliGRAM(s) Oral every 6 hours PRN Temp greater or equal to 38C (100.4F), Mild Pain (1 - 3)  albuterol    90 MICROgram(s) HFA Inhaler 2 Puff(s) Inhalation every 6 hours PRN Shortness of Breath  aluminum hydroxide/magnesium hydroxide/simethicone Suspension 30 milliLiter(s) Oral every 4 hours PRN Dyspepsia      I&O's Summary      PHYSICAL EXAM:  Vital Signs Last 24 Hrs  T(C): 36.8 (08 Jan 2023 10:30), Max: 37.2 (07 Jan 2023 15:48)  T(F): 98.2 (08 Jan 2023 10:30), Max: 99 (07 Jan 2023 15:48)  HR: 85 (08 Jan 2023 12:41) (78 - 99)  BP: 128/88 (08 Jan 2023 12:41) (122/83 - 153/88)  BP(mean): --  RR: 16 (08 Jan 2023 12:41) (16 - 20)  SpO2: 97% (08 Jan 2023 12:41) (97% - 100%)    Parameters below as of 08 Jan 2023 12:41  Patient On (Oxygen Delivery Method): room air      CONSTITUTIONAL: NAD, well-developed, well-groomed  EYES: PERRLA; conjunctiva and sclera clear  ENMT: Moist oral mucosa, no pharyngeal injection or exudates  NECK: Supple, no palpable masses  RESPIRATORY: Normal respiratory effort; lungs are clear to auscultation bilaterally  CARDIOVASCULAR: Regular rate and rhythm, normal S1 and S2, no murmur/rub/gallop; No lower extremity edema; Peripheral pulses are 2+ bilaterally  ABDOMEN: Nontender to palpation, normoactive bowel sounds, no rebound/guarding  MUSCULOSKELETAL:  no clubbing or cyanosis of digits; no joint swelling or tenderness to palpation  PSYCH: A+O to person, place, and time; affect appropriate  NEUROLOGY: CN 2-12 are intact and symmetric; no gross sensory deficits   SKIN: No rashes; no palpable lesions    LABS:                        11.9   9.52  )-----------( 252      ( 08 Jan 2023 05:15 )             36.5     01-08    136  |  100  |  13  ----------------------------<  93  3.7   |  23  |  0.95    Ca    8.7      08 Jan 2023 05:15  Mg     2.10     01-08    TPro  6.3  /  Alb  3.5  /  TBili  0.4  /  DBili  <0.2  /  AST  19  /  ALT  14  /  AlkPhos  68  01-08    PT/INR - ( 08 Jan 2023 05:15 )   PT: 12.6 sec;   INR: 1.09 ratio               Urinalysis Basic - ( 08 Jan 2023 02:00 )    Color: Light Yellow / Appearance: Clear / SG: >1.050 / pH: x  Gluc: x / Ketone: Negative  / Bili: Negative / Urobili: <2 mg/dL   Blood: x / Protein: Trace / Nitrite: Negative   Leuk Esterase: Moderate / RBC: 2 /HPF / WBC 18 /HPF   Sq Epi: x / Non Sq Epi: 3 /HPF / Bacteria: Few        SARS-CoV-2: NotDetec (07 Jan 2023 18:20)      RADIOLOGY & ADDITIONAL TESTS:  New Imaging Personally Reviewed Today:  CTA: no pulmonary embolism      COMMUNICATION:  Discussions with Patient/Family: Negative CT and plan for TTE discussed with son in law over the phone.

## 2023-01-08 NOTE — H&P ADULT - PROBLEM SELECTOR PLAN 1
Possibly from chronic lung disease (from freq pna) vs pna vs cardiac  -Pt's symptoms appear consistent with possible heart failure however clinically does not appear overloaded  -CT abd showed some atelectasis and CT PE is pending to r/o PE and pna., will f/u  -s/p antibiotics, hold further doses for now  -check echo, possibly has pulm HTN. trops stable, EKG NSR. Currently on room air breathing normal rate.  -watch on tele

## 2023-01-08 NOTE — H&P ADULT - NSHPLABSRESULTS_GEN_ALL_CORE
13.2   10.54 )-----------( 267      ( 07 Jan 2023 18:00 )             39.7       01-07    140  |  104  |  14  ----------------------------<  101<H>  3.7   |  25  |  1.13    Ca    9.1      07 Jan 2023 18:00    TPro  6.9  /  Alb  4.1  /  TBili  0.3  /  DBili  x   /  AST  16  /  ALT  13  /  AlkPhos  67  01-07                18:00 - VBG - pH: 7.39  | pCO2: 43    | pO2: 50    | Lactate: 0.9      CXR: clear lungs    EKG: normal sinus rhythm    CT abd: FINDINGS:  LOWER CHEST: Posteromedial left lower lobe opacity compatible with   partial left lower lobe atelectasis. Mild cardiomegaly.    LIVER: Within normal limits.  BILE DUCTS: Normal caliber.  GALLBLADDER: Cholelithiasis.  SPLEEN: Within normal limits.  PANCREAS: Within normal limits.  ADRENALS: Within normal limits.  KIDNEYS/URETERS: No hydronephrosis. Right renal cyst. Left renal   hypodensity too small to characterize.    BLADDER: Within normal limits.  REPRODUCTIVE ORGANS: Uterus and adnexa within normal limits.    BOWEL: No bowel obstruction. Appendix is normal. Colonic diverticulosis,   without diverticulitis.  PERITONEUM: No ascites.  VESSELS: Atherosclerotic changes.  RETROPERITONEUM/LYMPH NODES: No lymphadenopathy.  ABDOMINAL WALL: Within normal limits.  BONES: Degenerative changes.    IMPRESSION:  No acute intracranial findings.    Partial left lower lobe atelectasis of uncertain etiology. Correlation   with dedicated chest CT is recommended.

## 2023-01-09 ENCOUNTER — TRANSCRIPTION ENCOUNTER (OUTPATIENT)
Age: 78
End: 2023-01-09

## 2023-01-09 VITALS
DIASTOLIC BLOOD PRESSURE: 73 MMHG | TEMPERATURE: 99 F | SYSTOLIC BLOOD PRESSURE: 141 MMHG | HEART RATE: 81 BPM | OXYGEN SATURATION: 100 % | RESPIRATION RATE: 18 BRPM

## 2023-01-09 LAB
ANION GAP SERPL CALC-SCNC: 10 MMOL/L — SIGNIFICANT CHANGE UP (ref 7–14)
BASOPHILS # BLD AUTO: 0.04 K/UL — SIGNIFICANT CHANGE UP (ref 0–0.2)
BASOPHILS NFR BLD AUTO: 0.5 % — SIGNIFICANT CHANGE UP (ref 0–2)
BUN SERPL-MCNC: 14 MG/DL — SIGNIFICANT CHANGE UP (ref 7–23)
CALCIUM SERPL-MCNC: 9 MG/DL — SIGNIFICANT CHANGE UP (ref 8.4–10.5)
CHLORIDE SERPL-SCNC: 104 MMOL/L — SIGNIFICANT CHANGE UP (ref 98–107)
CO2 SERPL-SCNC: 25 MMOL/L — SIGNIFICANT CHANGE UP (ref 22–31)
CREAT SERPL-MCNC: 0.79 MG/DL — SIGNIFICANT CHANGE UP (ref 0.5–1.3)
EGFR: 77 ML/MIN/1.73M2 — SIGNIFICANT CHANGE UP
EOSINOPHIL # BLD AUTO: 0.24 K/UL — SIGNIFICANT CHANGE UP (ref 0–0.5)
EOSINOPHIL NFR BLD AUTO: 2.7 % — SIGNIFICANT CHANGE UP (ref 0–6)
GLUCOSE SERPL-MCNC: 105 MG/DL — HIGH (ref 70–99)
HCT VFR BLD CALC: 37.6 % — SIGNIFICANT CHANGE UP (ref 34.5–45)
HCV AB S/CO SERPL IA: 0.14 S/CO — SIGNIFICANT CHANGE UP (ref 0–0.99)
HCV AB SERPL-IMP: SIGNIFICANT CHANGE UP
HGB BLD-MCNC: 12.6 G/DL — SIGNIFICANT CHANGE UP (ref 11.5–15.5)
IANC: 5.16 K/UL — SIGNIFICANT CHANGE UP (ref 1.8–7.4)
IMM GRANULOCYTES NFR BLD AUTO: 0.9 % — SIGNIFICANT CHANGE UP (ref 0–0.9)
LYMPHOCYTES # BLD AUTO: 2.3 K/UL — SIGNIFICANT CHANGE UP (ref 1–3.3)
LYMPHOCYTES # BLD AUTO: 26.2 % — SIGNIFICANT CHANGE UP (ref 13–44)
MAGNESIUM SERPL-MCNC: 2.4 MG/DL — SIGNIFICANT CHANGE UP (ref 1.6–2.6)
MCHC RBC-ENTMCNC: 29.5 PG — SIGNIFICANT CHANGE UP (ref 27–34)
MCHC RBC-ENTMCNC: 33.5 GM/DL — SIGNIFICANT CHANGE UP (ref 32–36)
MCV RBC AUTO: 88.1 FL — SIGNIFICANT CHANGE UP (ref 80–100)
MONOCYTES # BLD AUTO: 0.95 K/UL — HIGH (ref 0–0.9)
MONOCYTES NFR BLD AUTO: 10.8 % — SIGNIFICANT CHANGE UP (ref 2–14)
NEUTROPHILS # BLD AUTO: 5.16 K/UL — SIGNIFICANT CHANGE UP (ref 1.8–7.4)
NEUTROPHILS NFR BLD AUTO: 58.9 % — SIGNIFICANT CHANGE UP (ref 43–77)
NRBC # BLD: 0 /100 WBCS — SIGNIFICANT CHANGE UP (ref 0–0)
NRBC # FLD: 0 K/UL — SIGNIFICANT CHANGE UP (ref 0–0)
PHOSPHATE SERPL-MCNC: 3.3 MG/DL — SIGNIFICANT CHANGE UP (ref 2.5–4.5)
PLATELET # BLD AUTO: 227 K/UL — SIGNIFICANT CHANGE UP (ref 150–400)
POTASSIUM SERPL-MCNC: 3.8 MMOL/L — SIGNIFICANT CHANGE UP (ref 3.5–5.3)
POTASSIUM SERPL-SCNC: 3.8 MMOL/L — SIGNIFICANT CHANGE UP (ref 3.5–5.3)
RBC # BLD: 4.27 M/UL — SIGNIFICANT CHANGE UP (ref 3.8–5.2)
RBC # FLD: 12.6 % — SIGNIFICANT CHANGE UP (ref 10.3–14.5)
SODIUM SERPL-SCNC: 139 MMOL/L — SIGNIFICANT CHANGE UP (ref 135–145)
WBC # BLD: 8.77 K/UL — SIGNIFICANT CHANGE UP (ref 3.8–10.5)
WBC # FLD AUTO: 8.77 K/UL — SIGNIFICANT CHANGE UP (ref 3.8–10.5)

## 2023-01-09 PROCEDURE — 93306 TTE W/DOPPLER COMPLETE: CPT | Mod: 26

## 2023-01-09 PROCEDURE — 99239 HOSP IP/OBS DSCHRG MGMT >30: CPT

## 2023-01-09 RX ORDER — ACETAMINOPHEN 500 MG
2 TABLET ORAL
Qty: 0 | Refills: 0 | DISCHARGE
Start: 2023-01-09

## 2023-01-09 RX ORDER — LOSARTAN POTASSIUM 100 MG/1
50 TABLET, FILM COATED ORAL DAILY
Refills: 0 | Status: DISCONTINUED | OUTPATIENT
Start: 2023-01-09 | End: 2023-01-09

## 2023-01-09 RX ORDER — ALBUTEROL 90 UG/1
0 AEROSOL, METERED ORAL
Qty: 0 | Refills: 0 | DISCHARGE

## 2023-01-09 RX ORDER — PANTOPRAZOLE SODIUM 20 MG/1
1 TABLET, DELAYED RELEASE ORAL
Qty: 30 | Refills: 0
Start: 2023-01-09 | End: 2023-02-07

## 2023-01-09 RX ORDER — ALBUTEROL 90 UG/1
2 AEROSOL, METERED ORAL
Qty: 0 | Refills: 0 | DISCHARGE
Start: 2023-01-09

## 2023-01-09 RX ORDER — PANTOPRAZOLE SODIUM 20 MG/1
40 TABLET, DELAYED RELEASE ORAL
Refills: 0 | Status: DISCONTINUED | OUTPATIENT
Start: 2023-01-09 | End: 2023-01-09

## 2023-01-09 RX ORDER — UMECLIDINIUM 62.5 UG/1
1 AEROSOL, POWDER ORAL
Qty: 0 | Refills: 0 | DISCHARGE

## 2023-01-09 RX ADMIN — HEPARIN SODIUM 5000 UNIT(S): 5000 INJECTION INTRAVENOUS; SUBCUTANEOUS at 18:43

## 2023-01-09 RX ADMIN — BUDESONIDE AND FORMOTEROL FUMARATE DIHYDRATE 2 PUFF(S): 160; 4.5 AEROSOL RESPIRATORY (INHALATION) at 09:37

## 2023-01-09 RX ADMIN — LOSARTAN POTASSIUM 50 MILLIGRAM(S): 100 TABLET, FILM COATED ORAL at 16:01

## 2023-01-09 RX ADMIN — HEPARIN SODIUM 5000 UNIT(S): 5000 INJECTION INTRAVENOUS; SUBCUTANEOUS at 05:17

## 2023-01-09 NOTE — DISCHARGE NOTE NURSING/CASE MANAGEMENT/SOCIAL WORK - NSDCPEFALRISK_GEN_ALL_CORE
For information on Fall & Injury Prevention, visit: [email or web address] OR  [email or web address] OR  https://www.cdc.gov/steadi/patient.html

## 2023-01-09 NOTE — DISCHARGE NOTE NURSING/CASE MANAGEMENT/SOCIAL WORK - PATIENT PORTAL LINK FT
You can access the FollowMyHealth Patient Portal offered by [facility] by registering at the following website: [email or web address]. By joining [employer]’s FollowMyHealth portal, you will also be able to view your health information using other applications (apps) compatible with our system.

## 2023-02-21 NOTE — DISCHARGE NOTE NURSING/CASE MANAGEMENT/SOCIAL WORK - NSPROEXTENSIONSOFSELF_GEN_A_NUR
ASSESSMENT/PLAN:     I have reviewed the nursing notes.  I have reviewed the findings, diagnosis, plan and need for follow up with the patient.          Discussed with patient that symptoms and exam are consistent with viral illness.    No clinical indications for antibiotic treatment at this time.      Symptomatic treatment - Encouraged fluids, salt water gargles, honey, elevation, humidifier, saline nasal spray, sinus rinse/netti pot, lozenges, tea, soup, smoothies, popsicles, topical vapor rub, rest, etc     May use over-the-counter Tylenol or ibuprofen PRN    Discussed warning signs/symptoms indicative of need to f/u  Follow up if symptoms persist or worsen or concerns      I explained my diagnostic considerations and recommendations to the patient, who voiced understanding and agreement with the treatment plan. All questions were answered. We discussed potential side effects of any prescribed or recommended therapies, as well as expectations for response to treatments.    Silva Umana NP  [facility]      SUBJECTIVE:   Reji Ricardo is a 25 year old male who presents to clinic today for the following health issues:  URI and school note    HPI          No past medical history on file.  Past Surgical History:   Procedure Laterality Date     APPENDECTOMY OPEN      06/22/2012,Laparoscopic     Social History     Tobacco Use     Smoking status: Never     Smokeless tobacco: Current     Types: Chew     Tobacco comments:     Quit smoking: very occasional   Substance Use Topics     Alcohol use: Never     Alcohol/week: 0.0 standard drinks     No current outpatient medications on file.     No Known Allergies      Past medical history, past surgical history, current medications and allergies reviewed and accurate to the best of my knowledge.        OBJECTIVE:     /74   Pulse 78   Temp 97.1  F (36.2  C) (Tympanic)   Resp 16   Wt 81.8 kg (180 lb 6.4 oz)   SpO2 98%   BMI 28.25  kg/m    Body mass index is 28.25 kg/m .     Physical Exam  General Appearance: Well appearing ***, appropriate appearance for age. No acute distress  Ears: Left TM intact, no erythema, no effusion, no bulging, no purulence.  Right TM intact, no erythema, no effusion, no bulging, no purulence.  Left auditory canal clear without drainage or bleeding.  Right auditory canal clear without drainage or bleeding.  Normal external ears, non tender.  Eyes: conjunctivae normal without erythema or irritation, corneas clear, no drainage or crusting, no eyelid swelling, pupils equal   Orophayrnx: moist mucous membranes, pharynx without erythema, tonsils without hypertrophy, tonsils without erythema, no tonsillar exudates, no oral lesions, no palate petechiae, no post nasal drip seen, no trismus, voice clear.    Sinuses:  No sinus tenderness upon palpation of the frontal or maxillary sinuses  Nose:  No noted drainage or congestion   Neck: supple without adenopathy  Respiratory: normal chest wall and respirations.  Normal effort.  Clear to auscultation bilaterally, no wheezing, crackles or rhonchi.  No increased work of breathing.  No cough appreciated.  Cardiac: RRR with no murmurs  Musculoskeletal:  Equal movement of bilateral upper extremities.  Equal movement of bilateral lower extremities.  Normal gait.    Psychological: normal affect, alert, oriented, and pleasant.       Labs:    Imaging:   none

## 2023-05-13 PROBLEM — Z00.00 ENCOUNTER FOR PREVENTIVE HEALTH EXAMINATION: Status: ACTIVE | Noted: 2023-05-13

## 2023-05-17 ENCOUNTER — APPOINTMENT (OUTPATIENT)
Dept: GASTROENTEROLOGY | Facility: CLINIC | Age: 78
End: 2023-05-17
Payer: COMMERCIAL

## 2023-05-17 VITALS
BODY MASS INDEX: 27.23 KG/M2 | DIASTOLIC BLOOD PRESSURE: 75 MMHG | SYSTOLIC BLOOD PRESSURE: 120 MMHG | OXYGEN SATURATION: 97 % | TEMPERATURE: 97.5 F | HEIGHT: 62 IN | WEIGHT: 148 LBS | HEART RATE: 74 BPM

## 2023-05-17 DIAGNOSIS — R06.02 SHORTNESS OF BREATH: ICD-10-CM

## 2023-05-17 DIAGNOSIS — K59.00 CONSTIPATION, UNSPECIFIED: ICD-10-CM

## 2023-05-17 DIAGNOSIS — R10.13 EPIGASTRIC PAIN: ICD-10-CM

## 2023-05-17 DIAGNOSIS — Z80.0 FAMILY HISTORY OF MALIGNANT NEOPLASM OF DIGESTIVE ORGANS: ICD-10-CM

## 2023-05-17 DIAGNOSIS — G89.29 RIGHT LOWER QUADRANT PAIN: ICD-10-CM

## 2023-05-17 DIAGNOSIS — Z78.9 OTHER SPECIFIED HEALTH STATUS: ICD-10-CM

## 2023-05-17 DIAGNOSIS — Z80.9 FAMILY HISTORY OF MALIGNANT NEOPLASM, UNSPECIFIED: ICD-10-CM

## 2023-05-17 DIAGNOSIS — Z86.79 PERSONAL HISTORY OF OTHER DISEASES OF THE CIRCULATORY SYSTEM: ICD-10-CM

## 2023-05-17 DIAGNOSIS — R10.31 RIGHT LOWER QUADRANT PAIN: ICD-10-CM

## 2023-05-17 DIAGNOSIS — Z87.09 PERSONAL HISTORY OF OTHER DISEASES OF THE RESPIRATORY SYSTEM: ICD-10-CM

## 2023-05-17 PROCEDURE — 99204 OFFICE O/P NEW MOD 45 MIN: CPT

## 2023-05-17 RX ORDER — POLYETHYLENE GLYCOL 3350 17 G/17G
17 POWDER, FOR SOLUTION ORAL DAILY
Qty: 30 | Refills: 3 | Status: ACTIVE | COMMUNITY
Start: 2023-05-17 | End: 1900-01-01

## 2023-05-17 RX ORDER — INHALER, ASSIST DEVICES
SPACER (EA) MISCELLANEOUS
Refills: 0 | Status: ACTIVE | COMMUNITY

## 2023-05-17 RX ORDER — POLYETHYLENE GLYCOL 3350 AND ELECTROLYTES WITH LEMON FLAVOR 236; 22.74; 6.74; 5.86; 2.97 G/4L; G/4L; G/4L; G/4L; G/4L
236 POWDER, FOR SOLUTION ORAL
Qty: 1 | Refills: 0 | Status: ACTIVE | COMMUNITY
Start: 2023-05-17 | End: 1900-01-01

## 2023-05-17 RX ORDER — LOSARTAN POTASSIUM 50 MG/1
50 TABLET, FILM COATED ORAL
Refills: 0 | Status: ACTIVE | COMMUNITY

## 2023-05-17 RX ORDER — PANTOPRAZOLE SODIUM 40 MG/1
40 GRANULE, DELAYED RELEASE ORAL
Refills: 0 | Status: ACTIVE | COMMUNITY

## 2023-05-17 RX ORDER — METOPROLOL TARTRATE 50 MG/1
50 TABLET, FILM COATED ORAL
Refills: 0 | Status: ACTIVE | COMMUNITY

## 2023-05-17 RX ORDER — SENNOSIDES 8.6 MG TABLETS 8.6 MG/1
8.6 TABLET ORAL
Refills: 0 | Status: ACTIVE | COMMUNITY

## 2023-05-17 RX ORDER — SIMETHICONE 125 MG/1
125 TABLET, CHEWABLE ORAL
Qty: 120 | Refills: 3 | Status: ACTIVE | COMMUNITY
Start: 2023-05-17 | End: 1900-01-01

## 2023-05-17 NOTE — ASSESSMENT
[FreeTextEntry1] : Abdominal Pain: The patient complains of abdominal pain. The patient is to avoid nonsteroidal anti-inflammatory drugs and aspirin.  I recommend a trial of Pepcid 40 mg twice a day for 3 months for the symptoms.\par Dyspepsia: The patient complains of dyspeptic symptoms.  The patient was advised to abide by an anti-gas (low FOD-MAP) diet.  The patient was given a pamphlet for anti-gas (low FOD-MAP).  The patient and I reviewed the anti-gas (low FOD-MAP) diet at length. The patient is to start on a trial of Simethicone one tablet 4 times a day p.r.n. abdominal pain and gas.\par Constipation: The patient complains of constipation. I recommend a high-fiber diet. I recommend a trial of a probiotic such as Align once a day. I recommend a trial of Metamucil once a day for fiber supplementation. I recommend a trial of Miralax 1 packet once a day for the constipation.  The patient agreed and will followup to reassess the symptoms.  \par Prior Endoscopic Procedures: The patient had a prior upper endoscopy and colonoscopy performed by another gastroenterologist, Dr. Joe Sutton.  I will try to obtain the prior upper endoscopy and colonoscopy reports.  The patient is to sign a record release to obtain those records.\par Prior Hospitalization: The patient was previously hospitalized at the [facility] [place].  The patient had blood work and imaging studies to assess the symptoms.  I reviewed the hospital records, blood work and imaging studies performed in the hospital.\par Family History of GI Malignancy: The patient admits to a family history of GI problems.  The patient’s father and brother had a history of colon cancer.\par Cholelithiasis: The recent CAT scan of the abdomen and pelvis performed revealed evidence of cholelithiasis.  The patient denies any abdominal pain.  The patient has no evidence suggestive of biliary colic.  The findings are suggestive of asymptomatic gallstones.  The patient and I had a long discussion regarding the findings of the abdominal ultrasound.  There is no need for a surgical evaluation at this time.   The patient and I also discussed the possible complications of gallstone disease that include biliary colic, gallbladder perforation, choledocholithiasis, cholangitis, gallstone ileus, gallstone pancreatitis, et cetera. The patient is aware and agrees to contact the office if symptoms develop.  The patient was advised to go to the emergency room if fever, chills and worsening abdominal pain develops.  If symptoms develop, a surgical evaluation and possible surgery may be required.  \par Family History of Colon Cancer: The patient has a family history of colon cancer.  I recommend a colonoscopy to reassess for colonic polyps.  The patient was told of the risks and benefits of the procedure.  The patient was told of the risks of perforation, emergency surgery, bleeding, infections and missed lesions.  The patient is to be on a clear liquid diet the entire day prior to the procedure. The patient is to complete the entire prescribed bowel prep the day prior to the procedure as directed. The patient is told not to drive, drink alcohol, use recreational drugs, exercise, or work the day of the procedure.  The patient was told of the need for an escort to accompany the patient home after the procedure. The patient is aware that the procedure may be cancelled if they fail to follow the directions.  The patient agreed and will schedule for the procedure. The patient can take the antihypertensive medication with a sip of water one hour prior to the procedure. The patient is to be n.p.o. after midnight and bowel prep was given.  The patient is to return for the procedure. \par Colonoscopy: I recommend a colonoscopy to assess the symptoms and for colonic polyps.  The patient was told of the risks and benefits of the procedure.  The patient was told of the risks of perforation, emergency surgery, bleeding, infections and missed lesions.  The patient is to be on a clear liquid diet the entire day prior to the procedure. The patient is to complete the entire prescribed bowel prep the day prior to the procedure as directed. The patient is told not to drive, drink alcohol, use recreational drugs, exercise, or work the day of the procedure.  The patient was told of the need for an escort to accompany the patient home after the procedure. The patient is aware that the procedure may be cancelled if they fail to follow the directions.  The patient agreed and will schedule for the procedure. The patient can take the antihypertensive medication with a sip of water one hour prior to the procedure. The patient is to return for the procedure.\par Follow-up: The patient is to follow-up in the office in 4 weeks to reassess the symptoms. The patient was told to call the office if any further problems. \par

## 2023-05-17 NOTE — REVIEW OF SYSTEMS
[Feeling Tired] : feeling tired [Shortness Of Breath] : shortness of breath [Wheezing] : wheezing [Cough] : cough [SOB on Exertion] : shortness of breath during exertion [Abdominal Pain] : abdominal pain [Constipation] : constipation [Bloating (gassiness)] : bloating [Arthralgias (joint pain)] : arthralgias [Dizziness] : dizziness [Hot Flashes] : hot flashes [Negative] : Heme/Lymph [FreeTextEntry3] : blurred vision [FreeTextEntry9] : back pain

## 2023-05-17 NOTE — HISTORY OF PRESENT ILLNESS
[de-identified] : The CT of the chest, angio, abdomen and pelvis with IV contrast performed on January 7, 2023 revealed posterior medial left lower lobe opacity compatible with partial left lower lobe atelectasis, mild cardiomegaly, cholelithiasis, right renal cyst, left renal hypodensity too small to characterize, colonic diverticulosis without diverticulitis, atherosclerotic changes of the vessels, degenerative changes of the bone.  The CT of the chest angio performed on January 8, 2023 revealed no pulmonary embolism, and enlarged main pulmonary artery which can be seen with pulmonary arterial hypertension, partial atelectasis of the left lower lobe and nonspecific enlarged mediastinal node.  Also noted was cholelithiasis, degenerative changes of the bones, cardiomegaly with no pericardial effusion, atherosclerotic calcifications of the aorta, mild enlarged precarinal node measuring 1.4 cm.  The patient's symptoms slowly improved.  [FreeTextEntry1] : The patient is a 71-year-old female with past medical history significant for hypertension and asthma who was referred to my office by Dr. Juan Carlos Lundberg for abdominal pain, constipation, change in bowel habits and change in caliber of stool. The patient also admits to having dyspepsia.  The patient also came to the office for evaluation for high-risk colon cancer screening.  The patient’s father and brother had a history of colon cancer.  I was asked to render an opinion for consultation for the above complaints.   The patient states that she is feeling uncomfortable. The patient was hospitalized at [facility] on 2023 for a cough and shortness of breath.  The patient was previously treated with azithromycin and  and levofloxacin with no improvement of the symptoms.  The patient had blood work and imaging studies performed in the hospital to assess the symptoms.  The blood work performed on 2023 revealed an elevated WBC count of 10.54 K/UL, no evidence of anemia with a hemoglobin/hematocrit level of 13.2/39.7, respectively, and elevated blood glucose of 101 mg/dL, a normal calcium level of 9.1 mg/dL, a low GFR of 15 mL/min per 1.73 m², a normal liver enzymes with a total bilirubin of 0.3 mg/dL, a normal alkaline phosphatase/AST/ALT of 67/16/13 U/L, respectively, a normal troponin level of 8 ng/L.  Urinalysis performed on 2023 revealed an elevated specific gravity 1.050, trace proteinuria, moderate leukocyte Estrace concentration, 18 WBCs per high-power field, few bacteria.  The CT of the chest, angio, abdomen and pelvis with IV contrast performed on 2023 revealed posterior medial left lower lobe opacity compatible with partial left lower lobe atelectasis, mild cardiomegaly, cholelithiasis, right renal cyst, left renal hypodensity too small to characterize, colonic diverticulosis without diverticulitis, atherosclerotic changes of the vessels, degenerative changes of the bone.  The CT of the chest angio performed on 2023 revealed no pulmonary embolism, and enlarged main pulmonary artery which can be seen with pulmonary arterial hypertension, partial atelectasis of the left lower lobe and nonspecific enlarged mediastinal node.  Also noted was cholelithiasis, degenerative changes of the bones, cardiomegaly with no pericardial effusion, atherosclerotic calcifications of the aorta, mild enlarged precarinal node measuring 1.4 cm.  The patient's symptoms slowly improved.  The last blood work performed on 2023 revealed no evidence of anemia with a hemoglobin/hematocrit level of 12.6/37.6, respectively, normal liver enzymes with a total bilirubin of 0.4 mg/dL, a normal alkaline phosphatase/AST/ALT of 68/19/14 U/L, respectively, a normal TSH of 4.01u IU/mL.  The chest x-ray performed on 2023 revealed retrocardiac opacity consistent with lower lobe atelectasis.  The transthoracic echocardiogram performed on 2023 revealed mitral annular calcification but otherwise normal mitral valve, minimal mitral regurgitation, normal left ventricular internal dimensions and wall thickness, endocardium not well visualized but grossly normal left ventricular systolic function, mild diastolic dysfunction (stage I), right ventricular enlargement with normal right ventricular systolic function and estimated right ventricular systolic pressure equals 40 mm per mercury consistent with mild pulmonary hypertension.  The patient was discharged in stable condition on 2023 and advised to follow-up in the office for further work-up or treatment.  The patient complains of abdominal pain.  The patient describes the abdominal pain as a crampy, intermittent right lower quadrant discomfort that is nonradiating in nature.  The abdominal pain is unrelated to meals or passing gas.  The abdominal pain improves with passing gas or having a bowel movement.  The abdominal pain is described as mild in nature.  The abdominal pain occurs at night and in the morning.  The abdominal pain can occur at any time.   The abdominal pain never has awakened the patient from sleep.  The abdominal pain is relieved with certain medication such as tylenol.  The abdominal pain is associated with abdominal gas and bloating.  The patient denies any nausea or vomiting.  The patient denies any gastroesophageal reflux disease or dysphagia. The patient complains of shortness of breath secondary to asthma but denies any atypical chest pain or palpitations.  The patient admits to occasional episodes of diaphoresis.  The patient complains of constipation but denies any diarrhea.  The patient has 1 bowel movement a day. The patient complains of a change in bowel habits.  The patient complains of a change in caliber of stool.   The patient denies having mucus discharge with the bowel movements.  The patient denies any bright red blood per rectum, melena or hematemesis.  The patient complains of rectal discomfort and rectal pruritus. The patient denies any weight loss or anorexia.  The patient admits to gaining weight recently. She admits to feeling feverish but denies any fevers or chills.  The patient denies any jaundice or pruritus.  The patient complains of chronic lower back pain.  The patient admits to having a prior upper endoscopy and colonoscopy performed by another gastroenterologist, Dr. Joe Sutton.  According to the patient, the upper endoscopic findings was unknown to the patient.  The colonoscopic findings was unknown to the patient The patient's last menstrual period was age 46. The patient is a .  The patient's first menstrual period was at age 14. The patient admits to a family history of GI problems.  The patient’s father and brother had a history of colon cancer.\par \par (-) smoking, (-) ETOH, (-) IVDA\par \par : Daughter Carson Rasheed

## 2023-09-05 ENCOUNTER — APPOINTMENT (OUTPATIENT)
Dept: GASTROENTEROLOGY | Facility: HOSPITAL | Age: 78
End: 2023-09-05

## 2025-04-10 NOTE — ED ADULT NURSE REASSESSMENT NOTE - INV PAIN INTERVENTIONS-NUMBER SCALE
Well , 12 Months Old  Well-child exams are visits with a health care provider to track your child's growth and development at certain ages. The following information tells you what to expect during this visit and gives you some helpful tips about caring for your child.  What immunizations does my child need?  Pneumococcal conjugate vaccine.  Haemophilus influenzae type b (Hib) vaccine.  Measles, mumps, and rubella (MMR) vaccine.  Varicella vaccine.  Hepatitis A vaccine.  Influenza vaccine (flu shot). An annual flu shot is recommended.  Other vaccines may be suggested to catch up on any missed vaccines or if your child has certain high-risk conditions.  For more information about vaccines, talk to your child's health care provider or go to the Centers for Disease Control and Prevention website for immunization schedules: www.cdc.gov/vaccines/schedules  What tests does my child need?  Your child's health care provider will:  Do a physical exam of your child.  Measure your child's length, weight, and head size. The health care provider will compare the measurements to a growth chart to see how your child is growing.  Screen for low red blood cell count (anemia) by checking protein in the red blood cells (hemoglobin) or the amount of red blood cells in a small sample of blood (hematocrit).  Your child may be screened for hearing problems, lead poisoning, or tuberculosis (TB), depending on risk factors.  Screening for signs of autism spectrum disorder (ASD) at this age is also recommended. Signs that health care providers may look for include:  Limited eye contact with caregivers.  No response from your child when his or her name is called.  Repetitive patterns of behavior.  Caring for your child  Oral health    [place] your child's teeth after meals and before bedtime. Use a small amount of fluoride toothpaste.  Take your child to a dentist to discuss oral health.  Give fluoride supplements or apply fluoride  "varnish to your child's teeth as told by your child's health care provider.  Provide all beverages in a cup and not in a bottle. Using a cup helps to prevent tooth decay.  Skin care  To prevent diaper rash, keep your child clean and dry. You may use over-the-counter diaper creams and ointments if the diaper area becomes irritated. Avoid diaper wipes that contain alcohol or irritating substances, such as fragrances.  When changing a girl's diaper, wipe from front to back to prevent a urinary tract infection.  Sleep  At this age, children typically sleep 12 or more hours a day and generally sleep through the night. They may wake up and cry from time to time.  Your child may start taking one nap a day in the afternoon instead of two naps. Let your child's morning nap naturally fade from your child's routine.  Keep naptime and bedtime routines consistent.  Medicines  Do not give your child medicines unless your child's health care provider says it is okay.  Parenting tips  Praise your child's good behavior by giving your child your attention.  Spend some one-on-one time with your child daily. Vary activities and keep activities short.  Set consistent limits. Keep rules for your child clear, short, and simple.  Recognize that your child has a limited ability to understand consequences at this age.  Interrupt your child's inappropriate behavior and show him or her what to do instead. You can also remove your child from the situation and have him or her do a more appropriate activity.  Avoid shouting at or spanking your child.  If your child cries to get what he or she wants, wait until your child briefly calms down before giving him or her the item or activity. Also, model the words that your child should use. For example, say \"cookie, please\" or \"climb up.\"  General instructions  Talk with your child's health care provider if you are worried about access to food or housing.  What's next?  Your next visit will take place " when your child is 15 months old.  Summary  Your child may receive vaccines at this visit.  Your child may be screened for hearing problems, lead poisoning, or tuberculosis (TB), depending on his or her risk factors.  Your child may start taking one nap a day in the afternoon instead of two naps. Let your child's morning nap naturally fade from your child's routine.  Brush your child's teeth after meals and before bedtime. Use a small amount of fluoride toothpaste.  This information is not intended to replace advice given to you by your health care provider. Make sure you discuss any questions you have with your health care provider.  Document Revised: 12/16/2022 Document Reviewed: 12/16/2022  [employer] Patient Education © 2023 [employer]      Sample Menu for an 8 to 12 Month Old  Now that your baby is eating solid foods, planning meals can be more challenging. At this age, your baby needs between 750 and 900 calories each day, about 400 to 500 of which should come from breast milk or formula (approximately 24 oz. [720 mL] a day). See the following sample menu ideas for an eight- to twelve-month-old.   1 cup = 8 ounces [240 mL]             4 ounces = 120 mL  6 ounces = 180 mL?           Breakfast  ¼ - ½ cup cereal or mashed egg  ¼ - ½ cup fruit, diced (if your child is self- feeding)  4-6 oz. formula or breastmilk  Snack?  4-6 oz. breastmilk or formula or water  ¼ cup diced cheese or cooked vegetables  Lunch  ¼ - ½ cup yogurt or cottage cheese or meat  ¼ - ½ cup yellow or orange vegetables  4-6 oz. formula or breastmilk  Snack  1 teething biscuit or cracker  ¼ cup yogurt or diced (if child is self-feeding) fruit Water  Dinner  ¼ cup diced poultry, meat, or tofu  ¼ - ½ cup green vegetables  ¼ cup noodles, pasta, rice, or potato  ¼ cup fruit  4-6 oz. formula or breastmilk  Before Bedtime  6-8 oz. formula or breastmilk or water (If formula or breastmilk, follow with water or brush teeth afterward).       ?    Last  Updated   12/8/2015  Carlos Enrique   Caring for Your Baby and Young Child: Birth to Age 5, 6th Edition (Copyright © 2015 American Academy [employer] Pediatric[employer])   There may be variations in treatment that your pediatrician may recommend based on individual facts and circumstances.       single medication modality